# Patient Record
Sex: FEMALE | Race: WHITE | NOT HISPANIC OR LATINO | Employment: FULL TIME | URBAN - NONMETROPOLITAN AREA
[De-identification: names, ages, dates, MRNs, and addresses within clinical notes are randomized per-mention and may not be internally consistent; named-entity substitution may affect disease eponyms.]

---

## 2021-07-06 ENCOUNTER — OFFICE VISIT (OUTPATIENT)
Dept: CARDIOLOGY | Facility: CLINIC | Age: 21
End: 2021-07-06

## 2021-07-06 VITALS
HEART RATE: 94 BPM | OXYGEN SATURATION: 98 % | WEIGHT: 248.2 LBS | BODY MASS INDEX: 37.62 KG/M2 | DIASTOLIC BLOOD PRESSURE: 81 MMHG | HEIGHT: 68 IN | SYSTOLIC BLOOD PRESSURE: 127 MMHG

## 2021-07-06 DIAGNOSIS — R07.2 PRECORDIAL PAIN: Primary | ICD-10-CM

## 2021-07-06 DIAGNOSIS — R06.02 SHORTNESS OF BREATH: ICD-10-CM

## 2021-07-06 DIAGNOSIS — Z82.49 FAMILY HISTORY OF EARLY CAD: ICD-10-CM

## 2021-07-06 DIAGNOSIS — R00.2 PALPITATIONS: ICD-10-CM

## 2021-07-06 PROCEDURE — 99204 OFFICE O/P NEW MOD 45 MIN: CPT | Performed by: PHYSICIAN ASSISTANT

## 2021-07-06 PROCEDURE — 93000 ELECTROCARDIOGRAM COMPLETE: CPT | Performed by: PHYSICIAN ASSISTANT

## 2021-07-06 RX ORDER — BUPROPION HYDROCHLORIDE 150 MG/1
150 TABLET ORAL DAILY
COMMUNITY
End: 2023-01-05

## 2021-07-06 RX ORDER — TRAZODONE HYDROCHLORIDE 50 MG/1
50 TABLET ORAL NIGHTLY
COMMUNITY
End: 2023-01-05

## 2021-07-06 NOTE — PATIENT INSTRUCTIONS

## 2021-07-06 NOTE — PROGRESS NOTES
Subjective   Eliza Aden is a 21 y.o. female     Chief Complaint   Patient presents with   • Establish Care       HPI  The patient presents to the clinic today to establish cardiovascular care, referred in the setting of symptoms and significant family history of premature coronary artery disease.  The patient denies a personal cardiovascular history.  She does note over the last few weeks to few months episodes of palpitations, chest discomfort, and increasing baseline dyspnea.  She reports her palpitations are brief irregularities in heart rhythm, not necessarily associated with dizziness.  She has never experienced syncope with that.  When symptoms are little more significant, the patient will no chest discomfort.  She describes more of a tightness and aching.  She has no associated neck, arm, or jaw discomfort.  She does have increasing dyspnea at that time.  Her father  at a very young age presumably from sudden cardiac death.  There is concern given that history and cardiac evaluation has been requested.  Previous laboratories suggest insulin resistance but have been unremarkable otherwise.  She presents today for evaluation and work-up given family history and symptoms.      Current Outpatient Medications   Medication Sig Dispense Refill   • buPROPion XL (WELLBUTRIN XL) 150 MG 24 hr tablet Take 150 mg by mouth Daily.     • traZODone (DESYREL) 50 MG tablet Take 50 mg by mouth Every Night.       No current facility-administered medications for this visit.       Patient has no known allergies.    Past Medical History:   Diagnosis Date   • Anxiety    • Asthma        Social History     Socioeconomic History   • Marital status: Single     Spouse name: Not on file   • Number of children: Not on file   • Years of education: Not on file   • Highest education level: Not on file   Tobacco Use   • Smoking status: Never Smoker   • Smokeless tobacco: Never Used   Substance and Sexual Activity   • Alcohol use: Never  "  • Drug use: Never   • Sexual activity: Never       Family History   Problem Relation Age of Onset   • No Known Problems Mother    • Heart attack Father    • Hypertension Father    • Hyperlipidemia Father    • Ovarian cancer Maternal Grandmother    • Hypertension Maternal Grandfather    • Hyperlipidemia Maternal Grandfather    • Heart disease Maternal Grandfather    • No Known Problems Paternal Grandmother    • Heart attack Paternal Grandfather        Review of Systems   Constitutional: Negative.  Negative for chills, fatigue and fever.   HENT: Negative for congestion, rhinorrhea and sore throat.    Eyes: Positive for visual disturbance (glasses).   Respiratory: Negative.  Negative for chest tightness, shortness of breath and wheezing.    Cardiovascular: Positive for palpitations. Negative for chest pain and leg swelling.   Gastrointestinal: Negative.    Endocrine: Negative.    Genitourinary: Negative.    Musculoskeletal: Negative.  Negative for arthralgias, back pain and neck pain.   Skin: Negative.  Negative for rash and wound.   Allergic/Immunologic: Positive for environmental allergies.   Neurological: Positive for headaches. Negative for dizziness, weakness and numbness.   Hematological: Negative.  Does not bruise/bleed easily.   Psychiatric/Behavioral: Positive for sleep disturbance (falling / staying asleep ).       Objective     Vitals:    07/06/21 1517   BP: 127/81   BP Location: Left arm   Patient Position: Sitting   Pulse: 94   SpO2: 98%   Weight: 113 kg (248 lb 3.2 oz)   Height: 172.7 cm (68\")        /81 (BP Location: Left arm, Patient Position: Sitting)   Pulse 94   Ht 172.7 cm (68\")   Wt 113 kg (248 lb 3.2 oz)   SpO2 98%   BMI 37.74 kg/m²      Lab Results (most recent)     None          Physical Exam  Vitals and nursing note reviewed.   Constitutional:       General: She is not in acute distress.     Appearance: She is well-developed.   HENT:      Head: Normocephalic and atraumatic.   Eyes: "      Conjunctiva/sclera: Conjunctivae normal.      Pupils: Pupils are equal, round, and reactive to light.   Neck:      Vascular: No JVD.      Trachea: No tracheal deviation.   Cardiovascular:      Rate and Rhythm: Normal rate and regular rhythm.      Heart sounds: Normal heart sounds.   Pulmonary:      Effort: Pulmonary effort is normal.      Breath sounds: Normal breath sounds.   Abdominal:      General: Bowel sounds are normal. There is no distension.      Palpations: Abdomen is soft. There is no mass.      Tenderness: There is no abdominal tenderness. There is no guarding or rebound.   Musculoskeletal:         General: No tenderness or deformity. Normal range of motion.      Cervical back: Normal range of motion and neck supple.   Skin:     General: Skin is warm and dry.      Coloration: Skin is not pale.      Findings: No erythema or rash.   Neurological:      Mental Status: She is alert and oriented to person, place, and time.   Psychiatric:         Behavior: Behavior normal.         Thought Content: Thought content normal.         Judgment: Judgment normal.         Procedure     ECG 12 Lead    Date/Time: 7/6/2021 3:22 PM  Performed by: Russell Dc PA  Authorized by: Russell Dc PA   Comparison: not compared with previous ECG   Comments: Sinus rhythm, rate 82, PVCs noted, no acute changes noted.  PVCs correlate with patient's symptoms of palpitations.                 Assessment/Plan      Diagnosis Plan   1. Precordial pain  Adult Transthoracic Echo Complete W/ Cont if Necessary Per Protocol    Holter Monitor - 24 Hour    Treadmill Stress Test   2. Shortness of breath  Adult Transthoracic Echo Complete W/ Cont if Necessary Per Protocol    Holter Monitor - 24 Hour    Treadmill Stress Test   3. Palpitations  Adult Transthoracic Echo Complete W/ Cont if Necessary Per Protocol    Holter Monitor - 24 Hour    Treadmill Stress Test   4. Family history of early CAD  Adult Transthoracic Echo Complete W/ Cont  if Necessary Per Protocol    Holter Monitor - 24 Hour    Treadmill Stress Test     1.  With the patient's family history and symptoms otherwise as above, she will be scheduled for cardiac evaluation.  I would schedule for regular treadmill stress test just for a stratification and evaluation otherwise.    2.  We will also schedule for an echo to evaluate the patient structurally, particularly given family history and ongoing symptoms as above.    3.  She does have palpitations which correlate well to PVCs noted by EKG today.  She does feel that some of her symptoms, in particular palpitations, are directly related to stress/anxiety.  If needed, we can consider suppressive therapies once we know results of the above studies.    4.  We will see her back after the above studies are available.  If abnormal, we will see her immediately.  She will call for any ongoing issues.  Further pending above.               Electronically signed by:

## 2021-07-15 ENCOUNTER — HOSPITAL ENCOUNTER (OUTPATIENT)
Dept: CARDIOLOGY | Facility: HOSPITAL | Age: 21
Discharge: HOME | End: 2021-07-15
Admitting: PHYSICIAN ASSISTANT

## 2021-07-15 DIAGNOSIS — Z82.49 FAMILY HISTORY OF EARLY CAD: ICD-10-CM

## 2021-07-15 DIAGNOSIS — R00.2 PALPITATIONS: ICD-10-CM

## 2021-07-15 DIAGNOSIS — R06.02 SHORTNESS OF BREATH: ICD-10-CM

## 2021-07-15 DIAGNOSIS — R07.2 PRECORDIAL PAIN: ICD-10-CM

## 2021-07-15 PROCEDURE — 93306 TTE W/DOPPLER COMPLETE: CPT | Performed by: INTERNAL MEDICINE

## 2021-07-15 PROCEDURE — 93306 TTE W/DOPPLER COMPLETE: CPT

## 2021-07-25 LAB
BH CV ECHO MEAS - ACS: 2.3 CM
BH CV ECHO MEAS - AO MAX PG: 4.2 MMHG
BH CV ECHO MEAS - AO MEAN PG: 3 MMHG
BH CV ECHO MEAS - AO ROOT AREA (BSA CORRECTED): 1.4
BH CV ECHO MEAS - AO ROOT AREA: 7.8 CM^2
BH CV ECHO MEAS - AO ROOT DIAM: 3.2 CM
BH CV ECHO MEAS - AO V2 MAX: 103 CM/SEC
BH CV ECHO MEAS - AO V2 MEAN: 75.1 CM/SEC
BH CV ECHO MEAS - AO V2 VTI: 21.2 CM
BH CV ECHO MEAS - BSA(HAYCOCK): 2.4 M^2
BH CV ECHO MEAS - BSA: 2.2 M^2
BH CV ECHO MEAS - BZI_BMI: 37.7 KILOGRAMS/M^2
BH CV ECHO MEAS - BZI_METRIC_HEIGHT: 172.7 CM
BH CV ECHO MEAS - BZI_METRIC_WEIGHT: 112.5 KG
BH CV ECHO MEAS - EDV(CUBED): 92.3 ML
BH CV ECHO MEAS - EDV(MOD-SP4): 101 ML
BH CV ECHO MEAS - EDV(TEICH): 93.4 ML
BH CV ECHO MEAS - EF(CUBED): 60.4 %
BH CV ECHO MEAS - EF(MOD-SP4): 53.5 %
BH CV ECHO MEAS - EF(TEICH): 52.1 %
BH CV ECHO MEAS - EF_3D-VOL: 49 %
BH CV ECHO MEAS - ESV(CUBED): 36.6 ML
BH CV ECHO MEAS - ESV(MOD-SP4): 47 ML
BH CV ECHO MEAS - ESV(TEICH): 44.8 ML
BH CV ECHO MEAS - FS: 26.5 %
BH CV ECHO MEAS - IVS/LVPW: 1
BH CV ECHO MEAS - IVSD: 0.98 CM
BH CV ECHO MEAS - LA DIMENSION: 3.4 CM
BH CV ECHO MEAS - LA/AO: 1.1
BH CV ECHO MEAS - LV DIASTOLIC VOL/BSA (35-75): 45.1 ML/M^2
BH CV ECHO MEAS - LV IVRT: 0.12 SEC
BH CV ECHO MEAS - LV MASS(C)D: 148.7 GRAMS
BH CV ECHO MEAS - LV MASS(C)DI: 66.4 GRAMS/M^2
BH CV ECHO MEAS - LV SYSTOLIC VOL/BSA (12-30): 21 ML/M^2
BH CV ECHO MEAS - LVIDD: 4.5 CM
BH CV ECHO MEAS - LVIDS: 3.3 CM
BH CV ECHO MEAS - LVLD AP4: 8.3 CM
BH CV ECHO MEAS - LVLS AP4: 6.7 CM
BH CV ECHO MEAS - LVOT AREA (M): 4.2 CM^2
BH CV ECHO MEAS - LVOT AREA: 4.2 CM^2
BH CV ECHO MEAS - LVOT DIAM: 2.3 CM
BH CV ECHO MEAS - LVPWD: 0.96 CM
BH CV ECHO MEAS - MV A MAX VEL: 42 CM/SEC
BH CV ECHO MEAS - MV DEC SLOPE: 323 CM/SEC^2
BH CV ECHO MEAS - MV E MAX VEL: 81 CM/SEC
BH CV ECHO MEAS - MV E/A: 1.9
BH CV ECHO MEAS - RAP SYSTOLE: 10 MMHG
BH CV ECHO MEAS - RVDD: 3.4 CM
BH CV ECHO MEAS - RVSP: 21.7 MMHG
BH CV ECHO MEAS - SI(AO): 73.8 ML/M^2
BH CV ECHO MEAS - SI(CUBED): 24.9 ML/M^2
BH CV ECHO MEAS - SI(MOD-SP4): 24.1 ML/M^2
BH CV ECHO MEAS - SI(TEICH): 21.7 ML/M^2
BH CV ECHO MEAS - SV(AO): 165.2 ML
BH CV ECHO MEAS - SV(CUBED): 55.8 ML
BH CV ECHO MEAS - SV(MOD-SP4): 54 ML
BH CV ECHO MEAS - SV(TEICH): 48.6 ML
BH CV ECHO MEAS - TR MAX VEL: 171 CM/SEC
MAXIMAL PREDICTED HEART RATE: 200 BPM
STRESS TARGET HR: 170 BPM

## 2021-07-26 ENCOUNTER — TELEPHONE (OUTPATIENT)
Dept: CARDIOLOGY | Facility: CLINIC | Age: 21
End: 2021-07-26

## 2021-07-26 NOTE — TELEPHONE ENCOUNTER
Left VM for patient     Echo results     Heart pump function did come back low BUT LOW NORMAL - it is NORMAL and is GOOD for her.       Patient to call back with any questions or concerns.     AT [facility]         ----- Message from NEMESIO Downs sent at 7/26/2021  8:48 AM EDT -----  See me on this study.

## 2022-12-28 ENCOUNTER — TELEPHONE (OUTPATIENT)
Dept: OBSTETRICS AND GYNECOLOGY | Facility: CLINIC | Age: 22
End: 2022-12-28

## 2022-12-28 NOTE — TELEPHONE ENCOUNTER
----- Message from Talisha Geller MA sent at 12/28/2022  9:13 AM EST -----  Regarding: FW: WANTING ASSISTANCE    ----- Message -----  From: Lala De La O RegSched Rep  Sent: 12/28/2022   9:11 AM EST  To: [facility] Gyn Clinical Pool  Subject: WANTING ASSISTANCE                               DINA RODAS APRN [phone]    WOULD LIKE TO SPEAK W/ SHAYY ARDON FOR ADVICE FOR PT CARE BEFORE, HER APPT

## 2023-01-03 ENCOUNTER — TELEPHONE (OUTPATIENT)
Dept: OBSTETRICS AND GYNECOLOGY | Facility: CLINIC | Age: 23
End: 2023-01-03
Payer: COMMERCIAL

## 2023-01-03 NOTE — TELEPHONE ENCOUNTER
S/w Shelia CHA at [facility] re: pt - states pt is having heavy bldg for the last 10 days now.  Hgb done 12/30/22 was 11.9 and pt has been advised to start daily PNV.  Taking Natazia COCs but Shelia is unsure for how long the pt has been on these or where she is in her current pill pkg.  States she did do a pregnancy test that was negative.  Advised her to tell pt if she saturates a pad q 30 min for 2 hrs consecutively, then she needs to rpt to ER.  If she did not start pills with a period, then she can try stopping them for 3-4 days and then restarting them but must be sure to be faithful with condom use for the next 7 days for contraception.  Pt has appt on 2/3/22 - will add her to the cancellation list.

## 2023-01-03 NOTE — TELEPHONE ENCOUNTER
Shelia with [facility] is calling and harpreet like to speak to you regarding this patient - you have not seen her but she is on your schedule  For 2/3/2023  [phone] after 4:30 [phone]

## 2023-01-04 ENCOUNTER — HOSPITAL ENCOUNTER (EMERGENCY)
Facility: HOSPITAL | Age: 23
Discharge: HOME | End: 2023-01-04
Attending: EMERGENCY MEDICINE | Admitting: EMERGENCY MEDICINE
Payer: COMMERCIAL

## 2023-01-04 VITALS
OXYGEN SATURATION: 100 % | WEIGHT: 250 LBS | HEART RATE: 112 BPM | RESPIRATION RATE: 16 BRPM | HEIGHT: 67 IN | TEMPERATURE: 98.2 F | DIASTOLIC BLOOD PRESSURE: 91 MMHG | SYSTOLIC BLOOD PRESSURE: 129 MMHG | BODY MASS INDEX: 39.24 KG/M2

## 2023-01-04 DIAGNOSIS — N93.9 ABNORMAL VAGINAL BLEEDING: Primary | ICD-10-CM

## 2023-01-04 DIAGNOSIS — D64.9 ANEMIA, UNSPECIFIED TYPE: ICD-10-CM

## 2023-01-04 LAB
BASOPHILS # BLD AUTO: 0.02 10*3/MM3 (ref 0–0.2)
BASOPHILS NFR BLD AUTO: 0.2 % (ref 0–1.5)
DEPRECATED RDW RBC AUTO: 43.5 FL (ref 37–54)
EOSINOPHIL # BLD AUTO: 0.07 10*3/MM3 (ref 0–0.4)
EOSINOPHIL NFR BLD AUTO: 0.8 % (ref 0.3–6.2)
ERYTHROCYTE [DISTWIDTH] IN BLOOD BY AUTOMATED COUNT: 14 % (ref 12.3–15.4)
HCG INTACT+B SERPL-ACNC: <0.1 MIU/ML
HCT VFR BLD AUTO: 34 % (ref 34–46.6)
HGB BLD-MCNC: 10.6 G/DL (ref 12–15.9)
HOLD SPECIMEN: NORMAL
IMM GRANULOCYTES # BLD AUTO: 0.05 10*3/MM3 (ref 0–0.05)
IMM GRANULOCYTES NFR BLD AUTO: 0.6 % (ref 0–0.5)
LYMPHOCYTES # BLD AUTO: 2.7 10*3/MM3 (ref 0.7–3.1)
LYMPHOCYTES NFR BLD AUTO: 30.5 % (ref 19.6–45.3)
MCH RBC QN AUTO: 27 PG (ref 26.6–33)
MCHC RBC AUTO-ENTMCNC: 31.2 G/DL (ref 31.5–35.7)
MCV RBC AUTO: 86.7 FL (ref 79–97)
MONOCYTES # BLD AUTO: 0.46 10*3/MM3 (ref 0.1–0.9)
MONOCYTES NFR BLD AUTO: 5.2 % (ref 5–12)
NEUTROPHILS NFR BLD AUTO: 5.55 10*3/MM3 (ref 1.7–7)
NEUTROPHILS NFR BLD AUTO: 62.7 % (ref 42.7–76)
NRBC BLD AUTO-RTO: 0 /100 WBC (ref 0–0.2)
PLATELET # BLD AUTO: 258 10*3/MM3 (ref 140–450)
PMV BLD AUTO: 9.8 FL (ref 6–12)
RBC # BLD AUTO: 3.92 10*6/MM3 (ref 3.77–5.28)
WBC NRBC COR # BLD: 8.85 10*3/MM3 (ref 3.4–10.8)
WHOLE BLOOD HOLD COAG: NORMAL
WHOLE BLOOD HOLD SPECIMEN: NORMAL

## 2023-01-04 PROCEDURE — 85025 COMPLETE CBC W/AUTO DIFF WBC: CPT | Performed by: EMERGENCY MEDICINE

## 2023-01-04 PROCEDURE — 99282 EMERGENCY DEPT VISIT SF MDM: CPT

## 2023-01-04 PROCEDURE — 84702 CHORIONIC GONADOTROPIN TEST: CPT | Performed by: EMERGENCY MEDICINE

## 2023-01-04 PROCEDURE — 36415 COLL VENOUS BLD VENIPUNCTURE: CPT

## 2023-01-04 RX ORDER — SODIUM CHLORIDE 0.9 % (FLUSH) 0.9 %
10 SYRINGE (ML) INJECTION AS NEEDED
Status: DISCONTINUED | OUTPATIENT
Start: 2023-01-04 | End: 2023-01-04 | Stop reason: HOSPADM

## 2023-01-04 NOTE — DISCHARGE INSTRUCTIONS
Take 2 of your birth control pills on a daily basis until follow-up.    Push fluids.    If any concern or worsening condition please return to the emergency department.

## 2023-01-04 NOTE — ED PROVIDER NOTES
EMERGENCY DEPARTMENT ENCOUNTER    Pt Name: Eliza Aden  MRN: [id number]  Pt :   2000  Room Number:    Date of encounter:  2023  PCP: Shelia Camarillo APRN  ED Provider: Asif Keys MD    Historian: Patient and mother      HPI:  Chief Complaint: Heavy vaginal bleeding        Context: Eliza Aden is a 22 y.o. female who presents to the ED c/o heavy vaginal bleeding which is been ongoing for roughly 1 month.  The patient reports heavy bleeding which is increased over the last 10 days and increased even further over the last 3 days.  The patient is not sexually active.  She is on birth control pills and is currently not on her sugar pill.  She has been following with a [place] gynecologist but now has an appointment here at [facility] with a gynecologist, Marquita Russell, tomorrow.  She has not previously had a pelvic examination.  The patient reports suprapubic abdominal pain which waxes and wanes.  She reports that it is currently mild in severity.  No fevers, chills, vomiting, diarrhea, cough.      PAST MEDICAL HISTORY  Past Medical History:   Diagnosis Date   • Anxiety    • Asthma          PAST SURGICAL HISTORY  Past Surgical History:   Procedure Laterality Date   • WISDOM TOOTH EXTRACTION           FAMILY HISTORY  Family History   Problem Relation Age of Onset   • No Known Problems Mother    • Heart attack Father    • Hypertension Father    • Hyperlipidemia Father    • Ovarian cancer Maternal Grandmother    • Hypertension Maternal Grandfather    • Hyperlipidemia Maternal Grandfather    • Heart disease Maternal Grandfather    • No Known Problems Paternal Grandmother    • Heart attack Paternal Grandfather          SOCIAL HISTORY  Social History     Socioeconomic History   • Marital status: Single   Tobacco Use   • Smoking status: Never   • Smokeless tobacco: Never   Substance and Sexual Activity   • Alcohol use: Never   • Drug use: Never   • Sexual activity: Never         ALLERGIES  Patient  has no known allergies.        REVIEW OF SYSTEMS  Review of Systems       All systems reviewed and negative except for those discussed in HPI.       PHYSICAL EXAM    I have reviewed the triage vital signs and nursing notes.    ED Triage Vitals   Temp Heart Rate Resp BP SpO2   01/04/23 0949 01/04/23 0949 01/04/23 0949 01/04/23 0950 01/04/23 0949   98.2 °F (36.8 °C) 112 16 149/92 97 %      Temp src Heart Rate Source Patient Position BP Location FiO2 (%)   -- -- -- -- --              Physical Exam  GENERAL:   Appears in no acute distress.  She presents with her mother who is a helpful historian.    HENT: Nares patent.  EYES: No scleral icterus.  CV: Regular rhythm, regular rate.  2+ radial pulses  RESPIRATORY: Normal effort.  No audible wheezes, rales or rhonchi.  ABDOMEN: Soft, non suprapubic abdominal tenderness to palpation.  No guarding rebound or rigidity.  MUSCULOSKELETAL: No deformities.   NEURO: Alert, moves all extremities, follows commands.  SKIN: Warm, dry, no rash visualized.      LAB RESULTS  Recent Results (from the past 24 hour(s))   Green Top (Gel)    Collection Time: 01/04/23 11:08 AM   Result Value Ref Range    Extra Tube Hold for add-ons.    Lavender Top    Collection Time: 01/04/23 11:08 AM   Result Value Ref Range    Extra Tube hold for add-on    Gold Top - SST    Collection Time: 01/04/23 11:08 AM   Result Value Ref Range    Extra Tube Hold for add-ons.    Light Blue Top    Collection Time: 01/04/23 11:08 AM   Result Value Ref Range    Extra Tube Hold for add-ons.    CBC Auto Differential    Collection Time: 01/04/23 11:08 AM    Specimen: Blood   Result Value Ref Range    WBC 8.85 3.40 - 10.80 10*3/mm3    RBC 3.92 3.77 - 5.28 10*6/mm3    Hemoglobin 10.6 (L) 12.0 - 15.9 g/dL    Hematocrit 34.0 34.0 - 46.6 %    MCV 86.7 79.0 - 97.0 fL    MCH 27.0 26.6 - 33.0 pg    MCHC 31.2 (L) 31.5 - 35.7 g/dL    RDW 14.0 12.3 - 15.4 %    RDW-SD 43.5 37.0 - 54.0 fl    MPV 9.8 6.0 - 12.0 fL    Platelets 258 140 -  450 10*3/mm3    Neutrophil % 62.7 42.7 - 76.0 %    Lymphocyte % 30.5 19.6 - 45.3 %    Monocyte % 5.2 5.0 - 12.0 %    Eosinophil % 0.8 0.3 - 6.2 %    Basophil % 0.2 0.0 - 1.5 %    Immature Grans % 0.6 (H) 0.0 - 0.5 %    Neutrophils, Absolute 5.55 1.70 - 7.00 10*3/mm3    Lymphocytes, Absolute 2.70 0.70 - 3.10 10*3/mm3    Monocytes, Absolute 0.46 0.10 - 0.90 10*3/mm3    Eosinophils, Absolute 0.07 0.00 - 0.40 10*3/mm3    Basophils, Absolute 0.02 0.00 - 0.20 10*3/mm3    Immature Grans, Absolute 0.05 0.00 - 0.05 10*3/mm3    nRBC 0.0 0.0 - 0.2 /100 WBC   hCG, Quantitative, Pregnancy    Collection Time: 01/04/23 11:08 AM    Specimen: Blood   Result Value Ref Range    HCG Quantitative <0.10 mIU/mL       If labs were ordered, I independently reviewed the results and considered them in treating the patient.        RADIOLOGY  No Radiology Exams Resulted Within Past 24 Hours          PROCEDURES    Procedures    No orders to display       MEDICATIONS GIVEN IN ER    Medications   sodium chloride 0.9 % flush 10 mL (has no administration in time range)         MEDICAL DECISION MAKING, PROGRESS, and CONSULTS    All labs have been independently reviewed by me.  All radiology studies have been reviewed by me and the radiologist dictating the report.  All EKG's have been independently viewed and interpreted by me.      Discussion below represents my analysis of pertinent findings related to patient's condition, differential diagnosis, treatment plan and final disposition.      Differential diagnosis:    Abnormal uterine bleeding.  Consider hormonal, fibroids, etc.      Additional sources:    - Discussed/ obtained information from independent historians: Patient's mother    - External (non-ED) record review: Confirmed in [facility] that the patient has an appointment with Marquita Russell tomorrow.    - Shared decision making: I spoke with patient and mother about our plan for evaluation and treatment.  They are in agreement.      Orders placed during  this visit:  Orders Placed This Encounter   Procedures   • [place] Draw   • CBC Auto Differential   • hCG, Quantitative, Pregnancy   • NPO Diet NPO Type: Strict NPO   • Undress & Gown   • Cardiac Monitoring (for HR >100 or SBP <110)   • Vital Signs   • Orthostatic Blood Pressure   • Supplies To Bedside - Notify MD When Ready- Pelvic cart / set up   • Vital Signs Recheck   • Pulse Oximetry   • Oxygen Therapy- Nasal Cannula; 2 LPM; Titrate for SPO2: equal to or greater than, 92%   • Insert Peripheral IV   • CBC & Differential   • Green Top (Gel)   • Lavender Top   • Gold Top - SST   • Gray Top   • Light Blue Top         Additional orders considered but not ordered:  Transvaginal ultrasound.    ED Course:    Consultants:      ED Course as of 01/04/23 1231   Wed Jan 04, 2023   1216 I have paged Dr. Acuña, on-call for Marquita Russell, the patient's scheduled nurse practitioner. [MS]   1229 I spoke with Dr. Grewal.  Case discussed in detail.  She reviewed the patient's epic chart.  She recommends the patient double up on her birth control pills and follow-up tomorrow as scheduled.  I spoke with the patient and her mother about this and they are both comfortable with this.  They understand the need to return if worse. [MS]      ED Course User Index  [MS] Asif Keys MD                  AS OF 12:31 EST VITALS:    BP - 149/92  HR - 112  TEMP - 98.2 °F (36.8 °C)  O2 SATS - 97%                  DIAGNOSIS  Final diagnoses:   Abnormal vaginal bleeding   Anemia, unspecified type         DISPOSITION  DISCHARGE    Patient discharged in stable condition.    Reviewed implications of results, diagnosis, meds, responsibility to follow up, warning signs and symptoms of possible worsening, potential complications and reasons to return to ER.    Patient/Family voiced understanding of above instructions.    Discussed plan for discharge, as there is no emergent indication for admission.  Pt/family is agreeable and understands need for follow up  and possible repeat testing.  Pt/family is aware that discharge does not mean that nothing is wrong but that it indicates no emergency is currently present that requires admission and they must continue care with follow-up as given below or with a physician of their choice.     FOLLOW-UP  Laisha Russell, APRN  [id number] [id number]  [phone]    In 1 day  AS ALREADY SCHEDULED         Medication List      No changes were made to your prescriptions during this visit.             Please note that portions of this document were completed with voice recognition software.      Asif Keys MD  01/04/23 5588

## 2023-01-05 ENCOUNTER — OFFICE VISIT (OUTPATIENT)
Dept: OBSTETRICS AND GYNECOLOGY | Facility: CLINIC | Age: 23
End: 2023-01-05
Payer: COMMERCIAL

## 2023-01-05 VITALS
DIASTOLIC BLOOD PRESSURE: 76 MMHG | BODY MASS INDEX: 40.87 KG/M2 | SYSTOLIC BLOOD PRESSURE: 112 MMHG | HEIGHT: 67 IN | WEIGHT: 260.4 LBS

## 2023-01-05 DIAGNOSIS — N91.3 PRIMARY OLIGOMENORRHEA: ICD-10-CM

## 2023-01-05 DIAGNOSIS — N92.1 MENORRHAGIA WITH IRREGULAR CYCLE: Primary | ICD-10-CM

## 2023-01-05 PROCEDURE — 99203 OFFICE O/P NEW LOW 30 MIN: CPT | Performed by: NURSE PRACTITIONER

## 2023-01-05 RX ORDER — DROSPIRENONE AND ESTETROL 3-14.2(28)
1 KIT ORAL DAILY
COMMUNITY
End: 2023-01-05

## 2023-01-05 RX ORDER — IBUPROFEN 800 MG/1
TABLET ORAL
COMMUNITY
Start: 2022-12-28 | End: 2023-04-05

## 2023-01-05 RX ORDER — LEVONORGESTREL AND ETHINYL ESTRADIOL 0.1-0.02MG
1 KIT ORAL DAILY
Qty: 28 TABLET | Refills: 12 | Status: SHIPPED | OUTPATIENT
Start: 2023-01-05 | End: 2024-01-05

## 2023-01-05 NOTE — PROGRESS NOTES
Chief Complaint  Eliza Aden is a 22 y.o.  female presenting for Gynecologic Exam (Patient was seen in  ED yesterday due to menorrhagia.  No exam done./Dysmenorrhea and back pain.  )    History of Present Illness  Eliza is a very pleasant 23yo nulligravid woman, here because of heavy and prolonged vaginal bleeding.  She got menarche at 15yo, and has always had irregular menstrual cycles.  Lifelong hx oligomenorrhea (sometimes as few as two menses/ year).  When she skips months, then she will have heavy & prolonged flow.  We have labs from PCP with a Hgb11.9 on 22.  At ER yesterday, Hgb was 10.6.  She was encouraged to double up on the OCPs, and it has helped flow.  She has never been SA.  No vaginitis sx.  She has never been told that she has polycystic appearance of ovaries, and has no significant hx of hyperandrogenism s/sx.      The following portions of the patient's history were reviewed and updated as appropriate: allergies, current medications, past family history, past medical history, past social history, past surgical history and problem list.    No Known Allergies      Current Outpatient Medications:   •  ibuprofen (ADVIL,MOTRIN) 800 MG tablet, TAKE 1 TABLET BY MOUTH EVERY 8 HOURS AS NEEDED FOR DISCOMFORT, Disp: , Rfl:   •  Levonorgest-Eth Estrad-Fe Bisg (Balcoltra) 0.1-20 MG-MCG(21) per tablet, Take 1 tablet by mouth Daily., Disp: 28 tablet, Rfl: 12    Past Medical History:   Diagnosis Date   • Anxiety    • Asthma         Past Surgical History:   Procedure Laterality Date   • WISDOM TOOTH EXTRACTION         Objective  /76   Ht 170.2 cm (67\")   Wt 118 kg (260 lb 6.4 oz)   LMP 2022 (Exact Date)   Breastfeeding No   BMI 40.78 kg/m²     Physical Exam  Vitals and nursing note reviewed. Exam conducted with a chaperone present.   Constitutional:       General: She is not in acute distress.     Appearance: Normal appearance. She is not ill-appearing.   Abdominal:       Palpations: Abdomen is soft. There is no mass.      Tenderness: There is no abdominal tenderness.   Genitourinary:     General: Normal vulva.      Labia:         Right: No rash, tenderness or lesion.         Left: No rash, tenderness or lesion.       Vagina: Normal. No erythema.      Cervix: No cervical motion tenderness, discharge, lesion or erythema.      Uterus: Normal. Not enlarged and not tender.       Adnexa: Right adnexa normal and left adnexa normal.        Right: No mass or tenderness.          Left: No mass or tenderness.        Rectum: Normal.      Comments: Anus appears wnl.  (No rectal exam performed.)  Skin:     General: Skin is warm and dry.   Neurological:      Mental Status: She is oriented to person, place, and time.   Psychiatric:         Mood and Affect: Mood normal.         Behavior: Behavior normal.         Assessment/Plan   Diagnoses and all orders for this visit:    1. Menorrhagia with irregular cycle (Primary)    2. Primary oligomenorrhea    Other orders  -     Levonorgest-Eth Estrad-Fe Bisg (Balcoltra) 0.1-20 MG-MCG(21) per tablet; Take 1 tablet by mouth Daily.  Dispense: 28 tablet; Refill: 12        Procedures    19 to 39: Counseling/Anticipatory Guidance Discussed: family planning/contraception    Return in about 3 months (around 4/5/2023) for Recheck.    Laisha Russell, STARLA  01/05/2023

## 2023-01-13 ENCOUNTER — HOSPITAL ENCOUNTER (OUTPATIENT)
Facility: HOSPITAL | Age: 23
Setting detail: OBSERVATION
Discharge: HOME | End: 2023-01-14
Attending: STUDENT IN AN ORGANIZED HEALTH CARE EDUCATION/TRAINING PROGRAM | Admitting: STUDENT IN AN ORGANIZED HEALTH CARE EDUCATION/TRAINING PROGRAM
Payer: COMMERCIAL

## 2023-01-13 ENCOUNTER — PREP FOR SURGERY (OUTPATIENT)
Dept: OTHER | Facility: HOSPITAL | Age: 23
End: 2023-01-13
Payer: COMMERCIAL

## 2023-01-13 ENCOUNTER — OFFICE VISIT (OUTPATIENT)
Dept: OBSTETRICS AND GYNECOLOGY | Facility: CLINIC | Age: 23
End: 2023-01-13
Payer: COMMERCIAL

## 2023-01-13 VITALS
BODY MASS INDEX: 40.81 KG/M2 | HEART RATE: 107 BPM | SYSTOLIC BLOOD PRESSURE: 122 MMHG | DIASTOLIC BLOOD PRESSURE: 80 MMHG | WEIGHT: 260 LBS | HEIGHT: 67 IN | OXYGEN SATURATION: 100 %

## 2023-01-13 DIAGNOSIS — D64.9 SYMPTOMATIC ANEMIA: ICD-10-CM

## 2023-01-13 DIAGNOSIS — D62 ANEMIA DUE TO ACUTE BLOOD LOSS: ICD-10-CM

## 2023-01-13 DIAGNOSIS — N92.1 MENORRHAGIA WITH IRREGULAR CYCLE: Primary | ICD-10-CM

## 2023-01-13 DIAGNOSIS — D64.9 SYMPTOMATIC ANEMIA: Primary | ICD-10-CM

## 2023-01-13 LAB
ABO GROUP BLD: NORMAL
ABO GROUP BLD: NORMAL
APTT PPP: 23.4 SECONDS (ref 22–39)
BASOPHILS # BLD AUTO: 0.02 10*3/MM3 (ref 0–0.2)
BASOPHILS NFR BLD AUTO: 0.3 % (ref 0–1.5)
BLD GP AB SCN SERPL QL: NEGATIVE
DEPRECATED RDW RBC AUTO: 46.9 FL (ref 37–54)
EOSINOPHIL # BLD AUTO: 0.05 10*3/MM3 (ref 0–0.4)
EOSINOPHIL NFR BLD AUTO: 0.7 % (ref 0.3–6.2)
ERYTHROCYTE [DISTWIDTH] IN BLOOD BY AUTOMATED COUNT: 14.7 % (ref 12.3–15.4)
FERRITIN SERPL-MCNC: 16.11 NG/ML (ref 13–150)
FOLATE SERPL-MCNC: 17.2 NG/ML (ref 4.78–24.2)
HCT VFR BLD AUTO: 21.5 % (ref 34–46.6)
HGB BLD-MCNC: 6.6 G/DL (ref 12–15.9)
IMM GRANULOCYTES # BLD AUTO: 0.05 10*3/MM3 (ref 0–0.05)
IMM GRANULOCYTES NFR BLD AUTO: 0.7 % (ref 0–0.5)
INR PPP: 1 (ref 0.84–1.13)
IRON 24H UR-MRATE: 21 MCG/DL (ref 37–145)
IRON SATN MFR SERPL: 4 % (ref 20–50)
LYMPHOCYTES # BLD AUTO: 2.39 10*3/MM3 (ref 0.7–3.1)
LYMPHOCYTES NFR BLD AUTO: 34.9 % (ref 19.6–45.3)
MCH RBC QN AUTO: 27.4 PG (ref 26.6–33)
MCHC RBC AUTO-ENTMCNC: 30.7 G/DL (ref 31.5–35.7)
MCV RBC AUTO: 89.2 FL (ref 79–97)
MONOCYTES # BLD AUTO: 0.31 10*3/MM3 (ref 0.1–0.9)
MONOCYTES NFR BLD AUTO: 4.5 % (ref 5–12)
NEUTROPHILS NFR BLD AUTO: 4.03 10*3/MM3 (ref 1.7–7)
NEUTROPHILS NFR BLD AUTO: 58.9 % (ref 42.7–76)
NRBC BLD AUTO-RTO: 0 /100 WBC (ref 0–0.2)
PLATELET # BLD AUTO: 259 10*3/MM3 (ref 140–450)
PMV BLD AUTO: 9.9 FL (ref 6–12)
PROTHROMBIN TIME: 13.1 SECONDS (ref 11.4–14.4)
RBC # BLD AUTO: 2.41 10*6/MM3 (ref 3.77–5.28)
RH BLD: POSITIVE
RH BLD: POSITIVE
T&S EXPIRATION DATE: NORMAL
TIBC SERPL-MCNC: 468 MCG/DL (ref 298–536)
TRANSFERRIN SERPL-MCNC: 314 MG/DL (ref 200–360)
VIT B12 BLD-MCNC: 302 PG/ML (ref 211–946)
WBC NRBC COR # BLD: 6.85 10*3/MM3 (ref 3.4–10.8)

## 2023-01-13 PROCEDURE — 86923 COMPATIBILITY TEST ELECTRIC: CPT

## 2023-01-13 PROCEDURE — 36430 TRANSFUSION BLD/BLD COMPNT: CPT

## 2023-01-13 PROCEDURE — 82607 VITAMIN B-12: CPT | Performed by: STUDENT IN AN ORGANIZED HEALTH CARE EDUCATION/TRAINING PROGRAM

## 2023-01-13 PROCEDURE — 84466 ASSAY OF TRANSFERRIN: CPT | Performed by: STUDENT IN AN ORGANIZED HEALTH CARE EDUCATION/TRAINING PROGRAM

## 2023-01-13 PROCEDURE — 86900 BLOOD TYPING SEROLOGIC ABO: CPT | Performed by: STUDENT IN AN ORGANIZED HEALTH CARE EDUCATION/TRAINING PROGRAM

## 2023-01-13 PROCEDURE — 86900 BLOOD TYPING SEROLOGIC ABO: CPT

## 2023-01-13 PROCEDURE — 85730 THROMBOPLASTIN TIME PARTIAL: CPT | Performed by: STUDENT IN AN ORGANIZED HEALTH CARE EDUCATION/TRAINING PROGRAM

## 2023-01-13 PROCEDURE — 85025 COMPLETE CBC W/AUTO DIFF WBC: CPT | Performed by: STUDENT IN AN ORGANIZED HEALTH CARE EDUCATION/TRAINING PROGRAM

## 2023-01-13 PROCEDURE — 86901 BLOOD TYPING SEROLOGIC RH(D): CPT

## 2023-01-13 PROCEDURE — 99222 1ST HOSP IP/OBS MODERATE 55: CPT | Performed by: STUDENT IN AN ORGANIZED HEALTH CARE EDUCATION/TRAINING PROGRAM

## 2023-01-13 PROCEDURE — P9016 RBC LEUKOCYTES REDUCED: HCPCS

## 2023-01-13 PROCEDURE — 83540 ASSAY OF IRON: CPT | Performed by: STUDENT IN AN ORGANIZED HEALTH CARE EDUCATION/TRAINING PROGRAM

## 2023-01-13 PROCEDURE — 99213 OFFICE O/P EST LOW 20 MIN: CPT | Performed by: NURSE PRACTITIONER

## 2023-01-13 PROCEDURE — 93005 ELECTROCARDIOGRAM TRACING: CPT | Performed by: STUDENT IN AN ORGANIZED HEALTH CARE EDUCATION/TRAINING PROGRAM

## 2023-01-13 PROCEDURE — G0378 HOSPITAL OBSERVATION PER HR: HCPCS

## 2023-01-13 PROCEDURE — 86901 BLOOD TYPING SEROLOGIC RH(D): CPT | Performed by: STUDENT IN AN ORGANIZED HEALTH CARE EDUCATION/TRAINING PROGRAM

## 2023-01-13 PROCEDURE — 85610 PROTHROMBIN TIME: CPT | Performed by: STUDENT IN AN ORGANIZED HEALTH CARE EDUCATION/TRAINING PROGRAM

## 2023-01-13 PROCEDURE — 86850 RBC ANTIBODY SCREEN: CPT | Performed by: STUDENT IN AN ORGANIZED HEALTH CARE EDUCATION/TRAINING PROGRAM

## 2023-01-13 PROCEDURE — 82728 ASSAY OF FERRITIN: CPT | Performed by: STUDENT IN AN ORGANIZED HEALTH CARE EDUCATION/TRAINING PROGRAM

## 2023-01-13 PROCEDURE — 82746 ASSAY OF FOLIC ACID SERUM: CPT | Performed by: STUDENT IN AN ORGANIZED HEALTH CARE EDUCATION/TRAINING PROGRAM

## 2023-01-13 RX ORDER — SODIUM CHLORIDE 0.9 % (FLUSH) 0.9 %
10 SYRINGE (ML) INJECTION EVERY 12 HOURS SCHEDULED
Status: CANCELLED | OUTPATIENT
Start: 2023-01-13

## 2023-01-13 RX ORDER — ONDANSETRON 4 MG/1
4 TABLET, FILM COATED ORAL EVERY 6 HOURS PRN
Status: DISCONTINUED | OUTPATIENT
Start: 2023-01-13 | End: 2023-01-14 | Stop reason: HOSPADM

## 2023-01-13 RX ORDER — SODIUM CHLORIDE 9 MG/ML
40 INJECTION, SOLUTION INTRAVENOUS AS NEEDED
Status: CANCELLED | OUTPATIENT
Start: 2023-01-13

## 2023-01-13 RX ORDER — ACETAMINOPHEN 325 MG/1
650 TABLET ORAL EVERY 4 HOURS PRN
Status: DISCONTINUED | OUTPATIENT
Start: 2023-01-13 | End: 2023-01-14 | Stop reason: HOSPADM

## 2023-01-13 RX ORDER — CYCLOBENZAPRINE HCL 10 MG
5 TABLET ORAL ONCE
Status: DISCONTINUED | OUTPATIENT
Start: 2023-01-14 | End: 2023-01-14 | Stop reason: HOSPADM

## 2023-01-13 RX ORDER — ONDANSETRON 4 MG/1
4 TABLET, FILM COATED ORAL EVERY 6 HOURS PRN
Status: CANCELLED | OUTPATIENT
Start: 2023-01-13

## 2023-01-13 RX ORDER — ACETAMINOPHEN 325 MG/1
650 TABLET ORAL EVERY 4 HOURS PRN
Status: CANCELLED | OUTPATIENT
Start: 2023-01-13

## 2023-01-13 RX ORDER — SODIUM CHLORIDE 0.9 % (FLUSH) 0.9 %
10 SYRINGE (ML) INJECTION AS NEEDED
Status: CANCELLED | OUTPATIENT
Start: 2023-01-13

## 2023-01-13 RX ORDER — DIPHENHYDRAMINE HCL 25 MG
25 CAPSULE ORAL NIGHTLY PRN
Status: CANCELLED | OUTPATIENT
Start: 2023-01-13

## 2023-01-13 RX ORDER — SODIUM CHLORIDE 0.9 % (FLUSH) 0.9 %
10 SYRINGE (ML) INJECTION EVERY 12 HOURS SCHEDULED
Status: DISCONTINUED | OUTPATIENT
Start: 2023-01-13 | End: 2023-01-14 | Stop reason: HOSPADM

## 2023-01-13 RX ORDER — SODIUM CHLORIDE 9 MG/ML
40 INJECTION, SOLUTION INTRAVENOUS AS NEEDED
Status: DISCONTINUED | OUTPATIENT
Start: 2023-01-13 | End: 2023-01-14 | Stop reason: HOSPADM

## 2023-01-13 RX ORDER — SODIUM CHLORIDE 0.9 % (FLUSH) 0.9 %
10 SYRINGE (ML) INJECTION AS NEEDED
Status: DISCONTINUED | OUTPATIENT
Start: 2023-01-13 | End: 2023-01-14 | Stop reason: HOSPADM

## 2023-01-13 RX ORDER — DIPHENHYDRAMINE HCL 25 MG
25 CAPSULE ORAL NIGHTLY PRN
Status: DISCONTINUED | OUTPATIENT
Start: 2023-01-13 | End: 2023-01-14 | Stop reason: HOSPADM

## 2023-01-13 RX ORDER — FERROUS SULFATE 325(65) MG
325 TABLET ORAL
COMMUNITY
End: 2023-04-05

## 2023-01-13 RX ADMIN — Medication 10 ML: at 21:50

## 2023-01-13 NOTE — PROGRESS NOTES
Chief Complaint  Eliza Aden is a 22 y.o.  female presenting for Vaginal Bleeding (Persistent vaginal bleeding, patient states that HGB continues to decrease.  Dizziness.  C/O nausea (feels that this is related to birth control pill, taking in the A.M.).  Feels that bleeding decreased yesterday.)    History of Present Illness  Eliza is a pleasant 21yo nulligravid woman, back today because of persistent menorrhagia.  (She is not yet SA.  Engaged to be  this summer.) She has lifelong oligomenorrhea, then will have heavy and prolonged menses.  She was seen at Out pt clinic in [place] hometown  and had Hgb 11.9.  She had a normal pelvic US  (4mm EMS / no polyps or fibroids or ov cysts).  She was started on a BCP.  She was eval at [facility] ER on 23 and Hgb was 10.6.  When I saw her here on , her OCP was changed to Balcoltra.  It has just started to decrease the flow on Wednesday evening.  Yesterday & today is better (saturating a pad in ~ 4 hrs).  But, until Wed evening, since last visit here, she had been changing pads 2-3x/hr.  Her Hgb Wed (hometown) was 7.2.  She c/o SOA, feeling dizzy, and having a little mid-sternal chest pain x couple of days.  Extreme fatigue with any activity, even short ambulation.      The following portions of the patient's history were reviewed and updated as appropriate: allergies, current medications, past family history, past medical history, past social history, past surgical history and problem list.    No Known Allergies      Current Outpatient Medications:   •  ferrous sulfate 325 (65 FE) MG tablet, Take 325 mg by mouth 3 (Three) Times a Day With Meals., Disp: , Rfl:   •  ibuprofen (ADVIL,MOTRIN) 800 MG tablet, TAKE 1 TABLET BY MOUTH EVERY 8 HOURS AS NEEDED FOR DISCOMFORT, Disp: , Rfl:   •  Levonorgest-Eth Estrad-Fe Bisg (Balcoltra) 0.1-20 MG-MCG(21) per tablet, Take 1 tablet by mouth Daily., Disp: 28 tablet, Rfl: 12    Past Medical History:   Diagnosis  "Date   • Anxiety    • Asthma         Past Surgical History:   Procedure Laterality Date   • WISDOM TOOTH EXTRACTION         Objective  /80   Pulse 107   Ht 170.2 cm (67\")   Wt 118 kg (260 lb)   LMP 12/23/2022 (Exact Date)   SpO2 100%   Breastfeeding No   BMI 40.72 kg/m²     Physical Exam  Vitals and nursing note reviewed. Exam conducted with a chaperone present.   Constitutional:       Appearance: Normal appearance.   Cardiovascular:      Rate and Rhythm: Regular rhythm. Tachycardia present.      Heart sounds: No murmur heard.  Pulmonary:      Effort: Pulmonary effort is normal. No respiratory distress.      Breath sounds: Normal breath sounds.   Abdominal:      Palpations: Abdomen is soft. There is no mass.      Tenderness: There is no abdominal tenderness.   Genitourinary:     General: Normal vulva.      Labia:         Right: No rash, tenderness or lesion.         Left: No rash, tenderness or lesion.       Vagina: Bleeding present. No erythema.      Cervix: No cervical motion tenderness, discharge, lesion or erythema.      Uterus: Normal. Not enlarged and not tender.       Adnexa: Right adnexa normal and left adnexa normal.        Right: No mass or tenderness.          Left: No mass or tenderness.        Rectum: Normal.      Comments: Light amt of active bleeding today.  Pink mucosa intact.    Neurological:      Mental Status: She is alert.         Assessment/Plan   Diagnoses and all orders for this visit:    1. Menorrhagia with irregular cycle (Primary)    2. Anemia due to acute blood loss    Consulted with out on-call physician.    Will be direct-admit to floor.  Dr. Acuña will order labs & blood.  (Plan for 2 units)  Procedures            No follow-ups on file.    Laisha Russell, APRN  01/13/2023  "

## 2023-01-13 NOTE — H&P
[facility]  Callie Shani Aden  : 2000  MRN: [id number]  CSN: [id number]    Consult Requested By:    Consulting Service: Gynecology   Reason for Consultation:    Date of consultation: 2023       Malcolm Aden is a 22 y.o. year old  who presents as a direct admit from APRN clinic due to acute blood loss, symptomatic anemia secondary to AUB. Patient reports her bleeding episode started 22, and steadily increased to the point she was seen in the ER on 22. Her Hgb at that time was 10.6. She was seen in APRN clinic the following day and started on Balcoltra. However, during the time from  to today, she reports having extreme vaginal bleeding to the point where she was filling more than 4 pads in 1 hour with large clots the size of her palm.  Today she reports trouble walking, increased shortness of breath, extreme fatigue and dizziness.  She was seen in the ER back home in [place], and was told her hemoglobin has gone from 10.6 to 7.2, and was tachycardic up to the 130s-160s.  Today her bleeding is much better controlled after 1 week on the Balcoltra, and has only needed to change her tampon about 4 times in 1 day.  However due to severe symptomatic anemia with acute blood loss, would recommend admission for observation and blood transfusion.  Patient and mother in agreement with plan of care.     Past Medical History:   Diagnosis Date   • Anxiety    • Asthma      Past Surgical History:   Procedure Laterality Date   • WISDOM TOOTH EXTRACTION       OB History    Para Term  AB Living   0 0 0 0 0 0   SAB IAB Ectopic Molar Multiple Live Births   0 0 0 0 0 0     Social History    Tobacco Use      Smoking status: Never      Smokeless tobacco: Never      Current Facility-Administered Medications:   •  acetaminophen (TYLENOL) tablet 650 mg, 650 mg, Oral, Q4H PRN, Karlene Acuña MD  •  diphenhydrAMINE (BENADRYL) capsule 25 mg, 25 mg, Oral, Nightly PRN,  Karlene Acuña MD  •  [START ON 1/14/2023] Levonorgest-Eth Estrad-Fe Bisg (BALCOLTRA) 0.1-20 MG-MCG(21) per tablet **PATIENT-SUPPLIED MED**, 1 tablet, Oral, Daily, Karlene Acuña MD  •  ondansetron (ZOFRAN) tablet 4 mg, 4 mg, Oral, Q6H PRN, Karlene Acuña MD  •  sodium chloride 0.9 % flush 10 mL, 10 mL, Intravenous, Q12H, Karlene Acuña MD  •  sodium chloride 0.9 % flush 10 mL, 10 mL, Intravenous, PRN, Karlene Acuña MD  •  sodium chloride 0.9 % infusion 40 mL, 40 mL, Intravenous, PRN, Karlene Acuña MD    No Known Allergies    Review of Systems   Constitutional: Positive for fatigue.   Genitourinary: Positive for vaginal bleeding.   Neurological: Positive for dizziness and weakness.   All other systems reviewed and are negative.        Objective   /65   Pulse 107   Temp 98.6 °F (37 °C) (Temporal)   Resp 18   LMP 12/23/2022 (Exact Date)   SpO2 98%   General: well developed; well nourished  no acute distress  obese - There is no height or weight on file to calculate BMI.   Heart: Not performed.   Lungs: breathing is unlabored   Abdomen: soft, non-tender; no masses  no umbilical or inguinal hernias are present   Pelvis: Deferred    Labs  CBC:   Lab Results   Component Value Date    WBC 6.85 01/13/2023    HGB 6.6 (C) 01/13/2023    HCT 21.5 (L) 01/13/2023     01/13/2023       Imaging Reviewed  None        Assessment   1. Symptomatic anemia   2. Acute blood loss anemia   3. AUB      Plan   1. Admit for repeat CBC, and plan for transfusion of 2u PRBCs ASAP with post transfusion Hgb ordered.   2. Coagulation studies and von Willebrand panel ordered, as well.   3. Vital signs currently stable and patient much improved from baseline.       Karlene Acuña MD  1/13/2023  15:32 EST

## 2023-01-14 VITALS
DIASTOLIC BLOOD PRESSURE: 69 MMHG | OXYGEN SATURATION: 98 % | TEMPERATURE: 98.2 F | HEART RATE: 106 BPM | SYSTOLIC BLOOD PRESSURE: 108 MMHG | RESPIRATION RATE: 18 BRPM

## 2023-01-14 LAB
BASOPHILS # BLD AUTO: 0.02 10*3/MM3 (ref 0–0.2)
BASOPHILS NFR BLD AUTO: 0.3 % (ref 0–1.5)
BH BB BLOOD EXPIRATION DATE: NORMAL
BH BB BLOOD EXPIRATION DATE: NORMAL
BH BB BLOOD TYPE BARCODE: 6200
BH BB BLOOD TYPE BARCODE: 6200
BH BB DISPENSE STATUS: NORMAL
BH BB DISPENSE STATUS: NORMAL
BH BB PRODUCT CODE: NORMAL
BH BB PRODUCT CODE: NORMAL
BH BB UNIT NUMBER: NORMAL
BH BB UNIT NUMBER: NORMAL
CROSSMATCH INTERPRETATION: NORMAL
CROSSMATCH INTERPRETATION: NORMAL
DEPRECATED RDW RBC AUTO: 47.5 FL (ref 37–54)
EOSINOPHIL # BLD AUTO: 0.06 10*3/MM3 (ref 0–0.4)
EOSINOPHIL NFR BLD AUTO: 1 % (ref 0.3–6.2)
ERYTHROCYTE [DISTWIDTH] IN BLOOD BY AUTOMATED COUNT: 15.1 % (ref 12.3–15.4)
HCT VFR BLD AUTO: 28.2 % (ref 34–46.6)
HGB BLD-MCNC: 8.7 G/DL (ref 12–15.9)
IMM GRANULOCYTES # BLD AUTO: 0.03 10*3/MM3 (ref 0–0.05)
IMM GRANULOCYTES NFR BLD AUTO: 0.5 % (ref 0–0.5)
LYMPHOCYTES # BLD AUTO: 2.24 10*3/MM3 (ref 0.7–3.1)
LYMPHOCYTES NFR BLD AUTO: 37.8 % (ref 19.6–45.3)
MCH RBC QN AUTO: 26.9 PG (ref 26.6–33)
MCHC RBC AUTO-ENTMCNC: 30.9 G/DL (ref 31.5–35.7)
MCV RBC AUTO: 87.3 FL (ref 79–97)
MONOCYTES # BLD AUTO: 0.34 10*3/MM3 (ref 0.1–0.9)
MONOCYTES NFR BLD AUTO: 5.7 % (ref 5–12)
NEUTROPHILS NFR BLD AUTO: 3.23 10*3/MM3 (ref 1.7–7)
NEUTROPHILS NFR BLD AUTO: 54.7 % (ref 42.7–76)
NRBC BLD AUTO-RTO: 0.3 /100 WBC (ref 0–0.2)
PLATELET # BLD AUTO: 279 10*3/MM3 (ref 140–450)
PMV BLD AUTO: 9.8 FL (ref 6–12)
RBC # BLD AUTO: 3.23 10*6/MM3 (ref 3.77–5.28)
UNIT  ABO: NORMAL
UNIT  ABO: NORMAL
UNIT  RH: NORMAL
UNIT  RH: NORMAL
WBC NRBC COR # BLD: 5.92 10*3/MM3 (ref 3.4–10.8)

## 2023-01-14 PROCEDURE — 85240 CLOT FACTOR VIII AHG 1 STAGE: CPT | Performed by: STUDENT IN AN ORGANIZED HEALTH CARE EDUCATION/TRAINING PROGRAM

## 2023-01-14 PROCEDURE — G0378 HOSPITAL OBSERVATION PER HR: HCPCS

## 2023-01-14 PROCEDURE — 99238 HOSP IP/OBS DSCHRG MGMT 30/<: CPT | Performed by: STUDENT IN AN ORGANIZED HEALTH CARE EDUCATION/TRAINING PROGRAM

## 2023-01-14 PROCEDURE — 93010 ELECTROCARDIOGRAM REPORT: CPT | Performed by: INTERNAL MEDICINE

## 2023-01-14 PROCEDURE — 85025 COMPLETE CBC W/AUTO DIFF WBC: CPT | Performed by: STUDENT IN AN ORGANIZED HEALTH CARE EDUCATION/TRAINING PROGRAM

## 2023-01-14 PROCEDURE — 85246 CLOT FACTOR VIII VW ANTIGEN: CPT | Performed by: STUDENT IN AN ORGANIZED HEALTH CARE EDUCATION/TRAINING PROGRAM

## 2023-01-14 PROCEDURE — 85245 CLOT FACTOR VIII VW RISTOCTN: CPT | Performed by: STUDENT IN AN ORGANIZED HEALTH CARE EDUCATION/TRAINING PROGRAM

## 2023-01-14 RX ADMIN — Medication 10 ML: at 08:49

## 2023-01-14 RX ADMIN — LEVONORGESTREL AND ETHINYL ESTRADIOL 1 TABLET: KIT at 08:49

## 2023-01-14 NOTE — DISCHARGE SUMMARY
[facility]   Callie Shani Aden  : 2000  MRN: [id number]  CSN: [id number]    Discharge Summary    A formal discharge summary was not needed because this admission was for an observation visit.    She presents as a direct admit from clinic for acute blood loss, symptomatic anemia from HMB. She received 2u PRBCs, with an improvement in her Hgb from 6.6 to 8.7. Patient reports feeling much improved, and is ready to go home on hospital day 1. Continue current OCP for bleeding control and follow up in 1-2 weeks or sooner PRN.     Discharge Date: 23    Discharge Dx:    1. Symptomatic anemia    Disposition: home   Condition at discharge: stable   Follow up: 1-2 weeks in clinic          This note has been electronically signed.    Karlene Acuña MD

## 2023-01-14 NOTE — PLAN OF CARE
Goal Outcome Evaluation:  Plan of Care Reviewed With: patient, mother        Progress: improving  Outcome Evaluation: VSS stable with elevated HR. Critical H&H values led to PRBC infusions. After typing and blood work, first unit is being tolerated well. Pt has consumed good nutrition and is in good spirits. Mother is at side. Continue to monitor and report as needed.

## 2023-01-16 ENCOUNTER — TELEPHONE (OUTPATIENT)
Dept: OBSTETRICS AND GYNECOLOGY | Facility: CLINIC | Age: 23
End: 2023-01-16
Payer: COMMERCIAL

## 2023-01-16 NOTE — TELEPHONE ENCOUNTER
----- Message from Maritza Steward sent at 1/16/2023 11:14 AM EST -----  Regarding: FW: follow up with [facility]    ----- Message -----  From: Karlene Acuña MD  Sent: 1/14/2023   9:37 AM EST  To: Leonides Garsia  [place]  Subject: follow up with Drew [facility]                         Please make this patient a follow up appointment with STARLA Cormier in the next 1-2 weeks. Thanks!  Karlene Acuña MD

## 2023-01-17 LAB
FACT VIII ACT/NOR PPP: 188 % (ref 56–140)
PATH INTERP BLD-IMP: NORMAL
VWF AG ACT/NOR PPP IA: 130 % (ref 50–200)
VWF:RCO ACT/NOR PPP PL AGG: 141 % (ref 50–200)

## 2023-01-19 ENCOUNTER — LAB (OUTPATIENT)
Dept: LAB | Facility: HOSPITAL | Age: 23
End: 2023-01-19
Payer: COMMERCIAL

## 2023-01-19 ENCOUNTER — OFFICE VISIT (OUTPATIENT)
Dept: OBSTETRICS AND GYNECOLOGY | Facility: CLINIC | Age: 23
End: 2023-01-19
Payer: COMMERCIAL

## 2023-01-19 VITALS
BODY MASS INDEX: 40.81 KG/M2 | WEIGHT: 260 LBS | DIASTOLIC BLOOD PRESSURE: 82 MMHG | SYSTOLIC BLOOD PRESSURE: 124 MMHG | HEIGHT: 67 IN

## 2023-01-19 DIAGNOSIS — R89.9 ABNORMAL LABORATORY TEST RESULT: Primary | ICD-10-CM

## 2023-01-19 DIAGNOSIS — R89.9 ABNORMAL LABORATORY TEST RESULT: ICD-10-CM

## 2023-01-19 DIAGNOSIS — D64.9 SYMPTOMATIC ANEMIA: ICD-10-CM

## 2023-01-19 PROCEDURE — 85247 CLOT FACTOR VIII MULTIMETRIC: CPT

## 2023-01-19 PROCEDURE — 99212 OFFICE O/P EST SF 10 MIN: CPT | Performed by: NURSE PRACTITIONER

## 2023-01-19 PROCEDURE — 85240 CLOT FACTOR VIII AHG 1 STAGE: CPT

## 2023-01-19 PROCEDURE — 85246 CLOT FACTOR VIII VW ANTIGEN: CPT

## 2023-01-19 PROCEDURE — 36415 COLL VENOUS BLD VENIPUNCTURE: CPT

## 2023-01-19 PROCEDURE — 85245 CLOT FACTOR VIII VW RISTOCTN: CPT

## 2023-01-19 NOTE — PROGRESS NOTES
"Chief Complaint  Eliza Aden is a 22 y.o.  female presenting for Follow-up (Follow-up after blood transfusion.  ) and Results (Discuss lab results.)    History of Present Illness  Eliza is a very pleasant 21yo nulligravid woman, here for 1 wk follow-up of a week long episode of hemorrhage.  (She had fallen to a Hgb of 6.6 when she was admitted last week for 2 units PRBC.  Hgb was 8.7 at discharge.  She tolerated the transfusions well without complications.)  She feels so much better today.  Still feels a little tired, but with Hgb 9.2 yesterday @ PCP.  She has no dizziness, SOA, CP.  The bleeding has stopped in the past week.  She had sl elevated Factor VIII testing, and is to have that repeated today.  No ACHES on the Balcoltra.  Will skip the placebo pills in this pack only.      The following portions of the patient's history were reviewed and updated as appropriate: allergies, current medications, past family history, past medical history, past social history, past surgical history and problem list.    No Known Allergies      Current Outpatient Medications:   •  ferrous sulfate 325 (65 FE) MG tablet, Take 325 mg by mouth 3 (Three) Times a Day With Meals., Disp: , Rfl:   •  ibuprofen (ADVIL,MOTRIN) 800 MG tablet, TAKE 1 TABLET BY MOUTH EVERY 8 HOURS AS NEEDED FOR DISCOMFORT, Disp: , Rfl:   •  Levonorgest-Eth Estrad-Fe Bisg (Balcoltra) 0.1-20 MG-MCG(21) per tablet, Take 1 tablet by mouth Daily., Disp: 28 tablet, Rfl: 12    Past Medical History:   Diagnosis Date   • Anxiety    • Asthma         Past Surgical History:   Procedure Laterality Date   • WISDOM TOOTH EXTRACTION         Objective  /82   Ht 170.2 cm (67\")   Wt 118 kg (260 lb)   LMP 2022 (Exact Date)   Breastfeeding No   BMI 40.72 kg/m²     Physical Exam  Vitals and nursing note reviewed.   Constitutional:       Appearance: Normal appearance.   Cardiovascular:      Rate and Rhythm: Normal rate and regular rhythm.      Heart " sounds: No murmur heard.  Pulmonary:      Effort: Pulmonary effort is normal. No respiratory distress.      Breath sounds: Normal breath sounds.   Skin:     General: Skin is warm and dry.   Neurological:      Mental Status: She is alert and oriented to person, place, and time.   Psychiatric:         Mood and Affect: Mood normal.         Behavior: Behavior normal.     Pelvic exam deferred, as the bleeding has ceased.    Assessment/Plan   Diagnoses and all orders for this visit:    1. Abnormal laboratory test result (Primary)  -     Von Willebrand Factor Activity; Future    We discussed possibility of needing to change methods if she has a thrombophilia / genetic mutation making her at increased risk for thromboembolic events.  But, continue the Balcoltra for now.    Repeat labs today & call pt with results.    Procedures            Return in about 3 months (around 4/19/2023) for Recheck.    Laisha Russell, APRN  01/19/2023

## 2023-01-21 LAB
FACT VIII ACT/NOR PPP: 215 % (ref 56–140)
VWF AG ACT/NOR PPP IA: 126 % (ref 50–200)
VWF:RCO ACT/NOR PPP PL AGG: 115 % (ref 50–200)

## 2023-01-23 DIAGNOSIS — R58 HEMORRHAGE: Primary | ICD-10-CM

## 2023-01-23 LAB
QT INTERVAL: 376 MS
QTC INTERVAL: 457 MS

## 2023-01-26 LAB — VWF MULTIMERS PPP IB: NORMAL

## 2023-02-06 ENCOUNTER — CONSULT (OUTPATIENT)
Dept: ONCOLOGY | Facility: CLINIC | Age: 23
End: 2023-02-06
Payer: COMMERCIAL

## 2023-02-06 ENCOUNTER — LAB (OUTPATIENT)
Dept: LAB | Facility: HOSPITAL | Age: 23
End: 2023-02-06
Payer: COMMERCIAL

## 2023-02-06 VITALS
DIASTOLIC BLOOD PRESSURE: 98 MMHG | HEART RATE: 110 BPM | BODY MASS INDEX: 40.49 KG/M2 | OXYGEN SATURATION: 99 % | RESPIRATION RATE: 20 BRPM | HEIGHT: 67 IN | TEMPERATURE: 97.7 F | SYSTOLIC BLOOD PRESSURE: 155 MMHG | WEIGHT: 258 LBS

## 2023-02-06 DIAGNOSIS — N92.1 MENORRHAGIA WITH IRREGULAR CYCLE: ICD-10-CM

## 2023-02-06 DIAGNOSIS — D64.9 SYMPTOMATIC ANEMIA: ICD-10-CM

## 2023-02-06 DIAGNOSIS — D64.9 SYMPTOMATIC ANEMIA: Primary | ICD-10-CM

## 2023-02-06 LAB
ALBUMIN SERPL-MCNC: 4.1 G/DL (ref 3.5–5.2)
ALBUMIN/GLOB SERPL: 1.1 G/DL
ALP SERPL-CCNC: 55 U/L (ref 39–117)
ALT SERPL W P-5'-P-CCNC: 13 U/L (ref 1–33)
ANION GAP SERPL CALCULATED.3IONS-SCNC: 12 MMOL/L (ref 5–15)
AST SERPL-CCNC: 15 U/L (ref 1–32)
BASOPHILS # BLD AUTO: 0.03 10*3/MM3 (ref 0–0.2)
BASOPHILS NFR BLD AUTO: 0.3 % (ref 0–1.5)
BILIRUB SERPL-MCNC: <0.2 MG/DL (ref 0–1.2)
BUN SERPL-MCNC: 10 MG/DL (ref 6–20)
BUN/CREAT SERPL: 13.7 (ref 7–25)
CALCIUM SPEC-SCNC: 8.9 MG/DL (ref 8.6–10.5)
CHLORIDE SERPL-SCNC: 107 MMOL/L (ref 98–107)
CLOSURE TME COLL+ADP BLD: 52 SECONDS (ref 54–123)
CLOSURE TME COLL+EPINEP BLD: 137 SECONDS (ref 72–192)
CO2 SERPL-SCNC: 22 MMOL/L (ref 22–29)
CREAT SERPL-MCNC: 0.73 MG/DL (ref 0.57–1)
DEPRECATED RDW RBC AUTO: 43.1 FL (ref 37–54)
EGFRCR SERPLBLD CKD-EPI 2021: 120.2 ML/MIN/1.73
EOSINOPHIL # BLD AUTO: 0.1 10*3/MM3 (ref 0–0.4)
EOSINOPHIL NFR BLD AUTO: 1.1 % (ref 0.3–6.2)
ERYTHROCYTE [DISTWIDTH] IN BLOOD BY AUTOMATED COUNT: 14.2 % (ref 12.3–15.4)
GLOBULIN UR ELPH-MCNC: 3.6 GM/DL
GLUCOSE SERPL-MCNC: 116 MG/DL (ref 65–99)
HCT VFR BLD AUTO: 36.7 % (ref 34–46.6)
HGB BLD-MCNC: 10.9 G/DL (ref 12–15.9)
IMM GRANULOCYTES # BLD AUTO: 0.05 10*3/MM3 (ref 0–0.05)
IMM GRANULOCYTES NFR BLD AUTO: 0.5 % (ref 0–0.5)
LYMPHOCYTES # BLD AUTO: 2.99 10*3/MM3 (ref 0.7–3.1)
LYMPHOCYTES NFR BLD AUTO: 32.9 % (ref 19.6–45.3)
MCH RBC QN AUTO: 24.8 PG (ref 26.6–33)
MCHC RBC AUTO-ENTMCNC: 29.7 G/DL (ref 31.5–35.7)
MCV RBC AUTO: 83.4 FL (ref 79–97)
MONOCYTES # BLD AUTO: 0.59 10*3/MM3 (ref 0.1–0.9)
MONOCYTES NFR BLD AUTO: 6.5 % (ref 5–12)
NEUTROPHILS NFR BLD AUTO: 5.34 10*3/MM3 (ref 1.7–7)
NEUTROPHILS NFR BLD AUTO: 58.7 % (ref 42.7–76)
NRBC BLD AUTO-RTO: 0 /100 WBC (ref 0–0.2)
PLATELET # BLD AUTO: 326 10*3/MM3 (ref 140–450)
PMV BLD AUTO: 10.3 FL (ref 6–12)
POTASSIUM SERPL-SCNC: 4.2 MMOL/L (ref 3.5–5.2)
PROT SERPL-MCNC: 7.7 G/DL (ref 6–8.5)
RBC # BLD AUTO: 4.4 10*6/MM3 (ref 3.77–5.28)
SODIUM SERPL-SCNC: 141 MMOL/L (ref 136–145)
WBC NRBC COR # BLD: 9.1 10*3/MM3 (ref 3.4–10.8)

## 2023-02-06 PROCEDURE — 85576 BLOOD PLATELET AGGREGATION: CPT

## 2023-02-06 PROCEDURE — 85025 COMPLETE CBC W/AUTO DIFF WBC: CPT

## 2023-02-06 PROCEDURE — 80053 COMPREHEN METABOLIC PANEL: CPT

## 2023-02-06 PROCEDURE — 36415 COLL VENOUS BLD VENIPUNCTURE: CPT

## 2023-02-06 PROCEDURE — 99205 OFFICE O/P NEW HI 60 MIN: CPT | Performed by: INTERNAL MEDICINE

## 2023-02-06 NOTE — PROGRESS NOTES
[facility] [address] [facility]    [address] [place] [address]    Phone:  [phone]                                       After Visit Summary   2/11/2017    Nel Farmer    MRN: [id number]           After Visit Summary Signature Page     I have received my discharge instructions, and my questions have been answered. I have discussed any challenges I see with this plan with the nurse or doctor.    ..........................................................................................................................................  Patient/Patient Representative Signature      ..........................................................................................................................................  Patient Representative Print Name and Relationship to Patient    ..................................................               ................................................  Date                                            Time    ..........................................................................................................................................  Reviewed by Signature/Title    ...................................................              ..............................................  Date                                                            Time           CHIEF COMPLAINT: No current complaints    REASON FOR REFERRAL: Heavy menses with large clot passage      RECORDS OBTAINED  Records of the patients history including those obtained from patient and hospital labs were reviewed and summarized in detail.    Oncology/Hematology History    No history exists.       HISTORY OF PRESENT ILLNESS:  The patient is a 22 y.o.  female, referred for heavy menses with severe anemia as a result with iron deficiency as a result and subsequent testing of hematologic panels including von Willebrand's panel and coagulation studies all of which were normal.  Sent to me because of the coincidental finding of an elevated Factor VIII activity.  Was told that she should not take birth control pills but I do not concur with that opinion relative to the risk of pregnancy and the risk of bleeding proportional to the degree of her risk of excess clotting from this and very weak procoagulant setting.    REVIEW OF SYSTEMS:  No somatic complaints    Past Medical History:   Diagnosis Date   • Anxiety    • Asthma      Past Surgical History:   Procedure Laterality Date   • WISDOM TOOTH EXTRACTION         Current Outpatient Medications on File Prior to Visit   Medication Sig Dispense Refill   • ferrous sulfate 325 (65 FE) MG tablet Take 325 mg by mouth 3 (Three) Times a Day With Meals.     • ibuprofen (ADVIL,MOTRIN) 800 MG tablet TAKE 1 TABLET BY MOUTH EVERY 8 HOURS AS NEEDED FOR DISCOMFORT     • Levonorgest-Eth Estrad-Fe Bisg (Balcoltra) 0.1-20 MG-MCG(21) per tablet Take 1 tablet by mouth Daily. 28 tablet 12     No current facility-administered medications on file prior to visit.       No Known Allergies    Social History     Socioeconomic History   • Marital status: Single   Tobacco Use   • Smoking status: Never   • Smokeless tobacco: Never   Vaping Use   • Vaping Use: Never used   Substance and Sexual Activity   • Alcohol use: Never   • Drug use: Never   • Sexual activity: Never       Family History  "  Problem Relation Age of Onset   • No Known Problems Mother    • Heart attack Father    • Hypertension Father    • Hyperlipidemia Father    • Ovarian cancer Maternal Grandmother    • Hypertension Maternal Grandfather    • Hyperlipidemia Maternal Grandfather    • Heart disease Maternal Grandfather    • No Known Problems Paternal Grandmother    • Heart attack Paternal Grandfather        PHYSICAL EXAM:  Good skin turgor.  Normal skin tone.  No jaundice or icterus.    /98   Pulse 110   Temp 97.7 °F (36.5 °C)   Resp 20   Ht 170.2 cm (67\")   Wt 117 kg (258 lb)   SpO2 99%   BMI 40.41 kg/m²     ECOG score: 0           ECOG: (0) Fully Active - Able to Carry On All Pre-disease Performance Without Restriction    Lab Results   Component Value Date    HGB 8.7 (L) 01/14/2023    HCT 28.2 (L) 01/14/2023    MCV 87.3 01/14/2023     01/14/2023    WBC 5.92 01/14/2023    NEUTROABS 3.23 01/14/2023    LYMPHSABS 2.24 01/14/2023    MONOSABS 0.34 01/14/2023    EOSABS 0.06 01/14/2023    BASOSABS 0.02 01/14/2023     No results found for: GLUCOSE, BUN, CREATININE, NA, K, CL, CO2, CALCIUM, PROTEINTOT, ALBUMIN, BILITOT, ALKPHOS, AST, ALT      Assessment & Plan      1.  Severe anemia due to dysfunctional uterine bleeding with elevated Factor VIII activity 215% and normal von Willebrand factor activity 115% with normal von Willebrand antigen 126% and normal von Willebrand multimer analysis with normal von Willebrand factor ristocetin cofactor assay these results are not consistent with von Willebrand's disease.  Elevated Factor VIII antigen and or activity is a risk factor for venous thrombosis rather than excessive bleeding.  Baseline PT and PTT goes against the likelihood of coagulation factor deficit.  Ferritin in January was at the low end of normal 16 with iron low 21 and normal total iron binding capacity consistent with iron deficiency of heavy menses.  Hemoglobin 1/4/2023 was 10.6 down to 6.6 on 1/13/2023 up to 8.7 with " transfusions and normal MCV and the rest of her counts are normal.  Doubt hematologic cause for the heavy menses.  I will check platelet function assay but if that is normal and she still having heavy periods then from a hematology standpoint, while I doubt there being a hematologic cause, I would refer her to  to the pediatric hematology program where they deal with platelet storage pool defects and other more rare conditions where they might have additional diagnostic procedures to go through but I am doubtful of that diagnosis.  She has not had joint bleeds or soft tissue bleeds and no other major bleeding with trauma that she is aware of.  No significant family history of such either.  She has not had heavy periods in the past but there was concern because she had so much clot that somehow this Factor VIII activity elevation had caused her to have the passage of clot.  There is no data to suggest that the modest hypercoagulability of Factor VIII activity elevation would cause any major propensity towards clotting though there is a modest risk.  I would not let the elevation in her Factor VIII activity keep her from getting appropriate hormonal therapy to regulate her periods as her risk of clotting would be much higher with pregnancy and also her risk of side effects from heavy menses to the degree that she experienced out of the blue back in January needs to be mitigated and is almost assuredly related to her hormonal milieu and not underlying coagulopathy or platelet dysfunction.  I will check her platelet function assay and if that shows any abnormality then I will likely send her to  pediatric hematology for an assessment of storage pool defects and other issues that are more of their purview though I think the odds of that is very low as she has no family history of this.  What she does have is a father who  in his 30s of a heart attack.  Could that have been related to the elevated factor VIII that  is possible but generally the propensity towards clotting from increased clotting factor activity or quantity is debated as to how procoagulant that actually is and generally causes venous clot not passage of large clot from heavy menses.  I would not let her elevated factor VIII activity keep her from traveling or normal routine of life in the future but in the near term, I told her that she may not travel to [place] until she gets through a few periods without massive bleeding as she just went through as she has not had a period since the hemoglobin in the sixes.  I will check a blood count today with her platelet function assay and go over all this with her when I see her back in a few weeks.  Also told her that she should be on iron replacement.  I usually give ferrous sulfate 325 mg twice a day every other day and if she does take it acid blocking medications to take vitamin C with that.  I will defer that to her gynecologist to decide.    Total time of care today discussing this complex decision tree with the patient and discussing the pathophysiology of clotting and bleeding and work-up thereof and after visit instituting the plan as outlined above took 1 hour of patient care time throughout the day today.    Tin Cortes MD    2/6/2023

## 2023-03-03 ENCOUNTER — LAB (OUTPATIENT)
Dept: LAB | Facility: HOSPITAL | Age: 23
End: 2023-03-03
Payer: COMMERCIAL

## 2023-03-03 ENCOUNTER — OFFICE VISIT (OUTPATIENT)
Dept: ONCOLOGY | Facility: CLINIC | Age: 23
End: 2023-03-03
Payer: COMMERCIAL

## 2023-03-03 VITALS
OXYGEN SATURATION: 98 % | HEART RATE: 102 BPM | SYSTOLIC BLOOD PRESSURE: 155 MMHG | BODY MASS INDEX: 40.97 KG/M2 | RESPIRATION RATE: 18 BRPM | WEIGHT: 261 LBS | TEMPERATURE: 98 F | HEIGHT: 67 IN | DIASTOLIC BLOOD PRESSURE: 90 MMHG

## 2023-03-03 DIAGNOSIS — D69.1 ABNORMAL PLATELET FUNCTION TEST: ICD-10-CM

## 2023-03-03 DIAGNOSIS — D64.9 SYMPTOMATIC ANEMIA: ICD-10-CM

## 2023-03-03 DIAGNOSIS — D64.9 SYMPTOMATIC ANEMIA: Primary | ICD-10-CM

## 2023-03-03 LAB
BASOPHILS # BLD AUTO: 0.02 10*3/MM3 (ref 0–0.2)
BASOPHILS NFR BLD AUTO: 0.3 % (ref 0–1.5)
CLOSURE TME COLL+ADP BLD: 80 SECONDS (ref 54–123)
CLOSURE TME COLL+EPINEP BLD: 122 SECONDS (ref 72–192)
DEPRECATED RDW RBC AUTO: 44.2 FL (ref 37–54)
EOSINOPHIL # BLD AUTO: 0.08 10*3/MM3 (ref 0–0.4)
EOSINOPHIL NFR BLD AUTO: 1.1 % (ref 0.3–6.2)
ERYTHROCYTE [DISTWIDTH] IN BLOOD BY AUTOMATED COUNT: 15.5 % (ref 12.3–15.4)
FERRITIN SERPL-MCNC: 9.73 NG/ML (ref 13–150)
HCT VFR BLD AUTO: 36.2 % (ref 34–46.6)
HGB BLD-MCNC: 10.9 G/DL (ref 12–15.9)
IMM GRANULOCYTES # BLD AUTO: 0.04 10*3/MM3 (ref 0–0.05)
IMM GRANULOCYTES NFR BLD AUTO: 0.6 % (ref 0–0.5)
IRON 24H UR-MRATE: 28 MCG/DL (ref 37–145)
IRON SATN MFR SERPL: 5 % (ref 20–50)
LYMPHOCYTES # BLD AUTO: 2.2 10*3/MM3 (ref 0.7–3.1)
LYMPHOCYTES NFR BLD AUTO: 30.5 % (ref 19.6–45.3)
MCH RBC QN AUTO: 23.7 PG (ref 26.6–33)
MCHC RBC AUTO-ENTMCNC: 30.1 G/DL (ref 31.5–35.7)
MCV RBC AUTO: 78.7 FL (ref 79–97)
MONOCYTES # BLD AUTO: 0.44 10*3/MM3 (ref 0.1–0.9)
MONOCYTES NFR BLD AUTO: 6.1 % (ref 5–12)
NEUTROPHILS NFR BLD AUTO: 4.44 10*3/MM3 (ref 1.7–7)
NEUTROPHILS NFR BLD AUTO: 61.4 % (ref 42.7–76)
PLATELET # BLD AUTO: 246 10*3/MM3 (ref 140–450)
PMV BLD AUTO: 10.1 FL (ref 6–12)
RBC # BLD AUTO: 4.6 10*6/MM3 (ref 3.77–5.28)
TIBC SERPL-MCNC: 562 MCG/DL (ref 298–536)
TRANSFERRIN SERPL-MCNC: 377 MG/DL (ref 200–360)
WBC NRBC COR # BLD: 7.22 10*3/MM3 (ref 3.4–10.8)

## 2023-03-03 PROCEDURE — 36415 COLL VENOUS BLD VENIPUNCTURE: CPT

## 2023-03-03 PROCEDURE — 85576 BLOOD PLATELET AGGREGATION: CPT

## 2023-03-03 PROCEDURE — 84466 ASSAY OF TRANSFERRIN: CPT

## 2023-03-03 PROCEDURE — 85025 COMPLETE CBC W/AUTO DIFF WBC: CPT

## 2023-03-03 PROCEDURE — 82728 ASSAY OF FERRITIN: CPT

## 2023-03-03 PROCEDURE — 99215 OFFICE O/P EST HI 40 MIN: CPT | Performed by: INTERNAL MEDICINE

## 2023-03-03 PROCEDURE — 83540 ASSAY OF IRON: CPT

## 2023-03-03 RX ORDER — FERROUS SULFATE 325(65) MG
TABLET ORAL
Qty: 30 TABLET | Refills: 11 | Status: SHIPPED | OUTPATIENT
Start: 2023-03-03

## 2023-03-03 NOTE — ADDENDUM NOTE
Addended by: ITZEL MCCRACKEN on: 3/3/2023 09:51 AM     Modules accepted: Orders, Level of Service

## 2023-03-03 NOTE — PROGRESS NOTES
"CHIEF COMPLAINT: Heavy menses    Problem List:  Oncology/Hematology History    No history exists.       HISTORY OF PRESENT ILLNESS:  The patient is a 22 y.o. female, here for follow up on management of heavy menses with abnormal platelet function assay as outlined below she had 1 period since last I saw her.  For some reason she stopped taking her iron when her prescription ran out though I had asked her to get with her gynecologist to get refills.    Past Medical History:   Diagnosis Date   • Anxiety    • Asthma      Past Surgical History:   Procedure Laterality Date   • WISDOM TOOTH EXTRACTION         No Known Allergies    Family History and Social History reviewed and changed as necessary    REVIEW OF SYSTEM:   No new somatic complaints    PHYSICAL EXAM:  No jaundice or icterus or pallor    Vitals:    03/03/23 0928   Height: 170.2 cm (67\")     There were no vitals filed for this visit.       ECOG score: 0           Vitals reviewed.    ECOG: (0) Fully Active - Able to Carry On All Pre-disease Performance Without Restriction    Lab Results   Component Value Date    HGB 10.9 (L) 02/06/2023    HCT 36.7 02/06/2023    MCV 83.4 02/06/2023     02/06/2023    WBC 9.10 02/06/2023    NEUTROABS 5.34 02/06/2023    LYMPHSABS 2.99 02/06/2023    MONOSABS 0.59 02/06/2023    EOSABS 0.10 02/06/2023    BASOSABS 0.03 02/06/2023       Lab Results   Component Value Date    GLUCOSE 116 (H) 02/06/2023    BUN 10 02/06/2023    CREATININE 0.73 02/06/2023     02/06/2023    K 4.2 02/06/2023     02/06/2023    CO2 22.0 02/06/2023    CALCIUM 8.9 02/06/2023    PROTEINTOT 7.7 02/06/2023    ALBUMIN 4.1 02/06/2023    BILITOT <0.2 02/06/2023    ALKPHOS 55 02/06/2023    AST 15 02/06/2023    ALT 13 02/06/2023             ASSESSMENT & PLAN:  1.  History of severe anemia due to heavy menses with elevated factor VIII activity and hence referred to hematology.  Please see my note from 2/6/2023 for an exhaustive explanation of my " skepticism towards there being any significant clinical relevance of the elevation of her factor VIII activity.She had a normal collagen epinephrine but a collagen ADP slightly below normal at 52.  Typically if this were a drug-induced platelet inhibition the epinephrine would be abnormal and the ADP would be normal.  Typically if this were an abnormal platelet function due to storage pool defects etc., she would have an abnormal ADP and an abnormal epinephrine.  The relevance of a marginally low collagen ADP with a normal epinephrine is not clear and I will repeat that to see if it is reproducible and if so then I will send her on to  pediatric hematology that deals with hemophilia and von Willebrand's to work this up further though I do not think she likely has either of these but could have a more rare storage pool defect.  Her hemoglobin was improving on iron now at 10.9 last month with MCV 83.4 and normal white count platelet count and differential.  CMP was unremarkable.  I will see her back in a few weeks to go over her repeat platelet function assay.  She has no clinical stigmata beyond heavy menses to suggest any bleeding disorders and typically platelet storage pool defects while potentially causing heavy menses usually causes other bleeding problems of which she has had none.  She did feel that her pulse started to race and felt a bit puny during her menses and we will see where her hemoglobin is but if it is lower I suspect it is the combination of menses along with not supplementing iron adequately since last I saw her.    Total time of care today inclusive of time spent today prior to her arrival reviewing interval labs and during visit translating this information to her and after visit putting forth a plan as outlined above took 46 minutes of patient care time throughout the day today.  Tin Cortes MD    03/03/2023

## 2023-04-05 ENCOUNTER — OFFICE VISIT (OUTPATIENT)
Dept: OBSTETRICS AND GYNECOLOGY | Facility: CLINIC | Age: 23
End: 2023-04-05
Payer: COMMERCIAL

## 2023-04-05 VITALS
WEIGHT: 256.4 LBS | SYSTOLIC BLOOD PRESSURE: 110 MMHG | HEIGHT: 67 IN | DIASTOLIC BLOOD PRESSURE: 80 MMHG | BODY MASS INDEX: 40.24 KG/M2

## 2023-04-05 DIAGNOSIS — N92.1 MENORRHAGIA WITH IRREGULAR CYCLE: ICD-10-CM

## 2023-04-05 DIAGNOSIS — Z09 FOLLOW-UP EXAM: Primary | ICD-10-CM

## 2023-04-05 DIAGNOSIS — D64.9 ANEMIA, UNSPECIFIED TYPE: ICD-10-CM

## 2023-04-05 PROCEDURE — 99213 OFFICE O/P EST LOW 20 MIN: CPT | Performed by: NURSE PRACTITIONER

## 2023-04-05 NOTE — PROGRESS NOTES
"Chief Complaint  Eliza Aden is a 22 y.o.  female presenting for Follow-up (3 month follow-up after starting Balcoltra.)    History of Present Illness  Eliza is a very pleasant young 21yo nulligravid woman.  She is here for follow up of menorrhagia, to the extent that she required transfusions (2 units PRBC).  She is doing very well now on Balcoltra.  Her menses come only when planned by the COCPs.  No intermenstrual bleeding.  The menses are much lighter, shorter, and less painful.  The flow will last 4 days long, and be a normal amt.  (On the heaviest day, she can wear a pad/ tampon for ~ 4 hrs.)  She has not yet become SA.  She has no abd or pelvic pain.  No ACHES.  Otherwise, ROS negative.  Her extensive hematology work-up was essentially negative.          The following portions of the patient's history were reviewed and updated as appropriate: allergies, current medications, past family history, past medical history, past social history, past surgical history and problem list.    No Known Allergies      Current Outpatient Medications:   •  ferrous sulfate 325 (65 FE) MG tablet, 325 mg p.o. twice daily every other day with vitamin C, Disp: 30 tablet, Rfl: 11  •  Levonorgest-Eth Estrad-Fe Bisg (Balcoltra) 0.1-20 MG-MCG(21) per tablet, Take 1 tablet by mouth Daily., Disp: 28 tablet, Rfl: 12    Past Medical History:   Diagnosis Date   • Anxiety    • Asthma         Past Surgical History:   Procedure Laterality Date   • WISDOM TOOTH EXTRACTION         Objective  /80   Ht 170.2 cm (67\")   Wt 116 kg (256 lb 6.4 oz)   LMP 2023 (Exact Date)   Breastfeeding No   BMI 40.16 kg/m²     Physical Exam  Vitals and nursing note reviewed.   Constitutional:       Appearance: Normal appearance.   Cardiovascular:      Rate and Rhythm: Normal rate and regular rhythm.      Heart sounds: No murmur heard.  Pulmonary:      Effort: Pulmonary effort is normal. No respiratory distress.      Breath sounds: " Normal breath sounds.   Skin:     General: Skin is warm and dry.   Neurological:      Mental Status: She is alert and oriented to person, place, and time.   Psychiatric:         Mood and Affect: Mood normal.         Behavior: Behavior normal.         Assessment/Plan   Diagnoses and all orders for this visit:    1. Follow-up exam (Primary)    2. Menorrhagia with irregular cycle    3. Anemia, unspecified type    Encouraged to continue the iron supplements, and try to eat plenty of iron-rich foods.  Do continue the Balcoltra.      Procedures    19 to 39: Counseling/Anticipatory Guidance Discussed: nutrition and family planning/contraception    Return for Annual physical in [place] Russell, APRN  04/05/2023

## 2024-01-31 ENCOUNTER — OFFICE VISIT (OUTPATIENT)
Dept: OBSTETRICS AND GYNECOLOGY | Facility: CLINIC | Age: 24
End: 2024-01-31
Payer: COMMERCIAL

## 2024-01-31 VITALS
BODY MASS INDEX: 40.46 KG/M2 | SYSTOLIC BLOOD PRESSURE: 140 MMHG | DIASTOLIC BLOOD PRESSURE: 92 MMHG | WEIGHT: 257.8 LBS | HEIGHT: 67 IN

## 2024-01-31 DIAGNOSIS — Z86.2 HISTORY OF ANEMIA: ICD-10-CM

## 2024-01-31 DIAGNOSIS — L70.9 ACNE, UNSPECIFIED ACNE TYPE: ICD-10-CM

## 2024-01-31 DIAGNOSIS — E66.01 MORBID OBESITY WITH BMI OF 40.0-44.9, ADULT: ICD-10-CM

## 2024-01-31 DIAGNOSIS — Z01.411 ENCOUNTER FOR GYNECOLOGICAL EXAMINATION WITH ABNORMAL FINDING: Primary | ICD-10-CM

## 2024-01-31 NOTE — PROGRESS NOTES
"Chief Complaint  Eliza Aden is a 23 y.o.  female presenting for Annual Exam and Contraception (Patient discontinued birth control pills.  Please discuss contraception.  )    History of Present Illness  Eliza is a pleasant 22yo, nulligravid woman, here for annual gyn exam.  She has no history of any gynecologic surgeries.  She does have a hx of menorrhagia and severe anemia, necessitating a transfusion (2 units PRBC) last year.    She tolerated Balcoltra well, and at last follow up, she was having normal, monthly, 4 day menses.  States in 2023, she stopped having withdrawal menses with each pack.    She discontinued the pills on her own.  States she is having spontaneous menses, now regular on her own (6d/ mo).  She  in July, and states they are using condoms faithfully.  She declines hormonal methods at this time.  Her  has joined the [employer].  After he completes training, they will be moving away.  She is troubled re: wt gain.  She works out at the gym 2 or more times/ week and diets, but cannot lose weight.  TSH has been checked in past yr (wnl).    The following portions of the patient's history were reviewed and updated as appropriate: allergies, current medications, past family history, past medical history, past social history, past surgical history, and problem list.    No Known Allergies    No current outpatient medications on file.    Past Medical History:   Diagnosis Date    Anxiety     Asthma         Past Surgical History:   Procedure Laterality Date    WISDOM TOOTH EXTRACTION         Objective  /92   Ht 170.2 cm (67\")   Wt 117 kg (257 lb 12.8 oz)   LMP 2024 (Exact Date)   Breastfeeding No   BMI 40.38 kg/m²     Physical Exam  Vitals and nursing note reviewed. Exam conducted with a chaperone present.   Constitutional:       General: She is not in acute distress.     Appearance: Normal appearance. She is not ill-appearing.   HENT:      Head: " Normocephalic.   Neck:      Thyroid: No thyroid mass or thyromegaly.   Cardiovascular:      Rate and Rhythm: Normal rate and regular rhythm.      Heart sounds: Normal heart sounds. No murmur heard.  Pulmonary:      Effort: Pulmonary effort is normal. No respiratory distress.      Breath sounds: Normal breath sounds.   Chest:   Breasts:     Right: No inverted nipple, mass or nipple discharge.      Left: No inverted nipple, mass or nipple discharge.   Abdominal:      Palpations: Abdomen is soft. There is no mass.      Tenderness: There is no abdominal tenderness.   Genitourinary:     General: Normal vulva.      Labia:         Right: No rash, tenderness or lesion.         Left: No rash, tenderness or lesion.       Vagina: Normal. No erythema.      Cervix: No discharge, lesion or erythema.      Uterus: Not enlarged and not tender.       Adnexa:         Right: No mass or tenderness.          Left: No mass or tenderness.        Comments: Anus appears wnl.  No rectal exam performed.  Lymphadenopathy:      Upper Body:      Right upper body: No supraclavicular or axillary adenopathy.      Left upper body: No supraclavicular or axillary adenopathy.   Skin:     General: Skin is warm and dry.      Findings: Acne present.      Comments: Mild acne (face)   Neurological:      Mental Status: She is alert and oriented to person, place, and time.   Psychiatric:         Mood and Affect: Mood normal.         Behavior: Behavior normal.         Assessment/Plan   Diagnoses and all orders for this visit:    1. Encounter for gynecological examination with abnormal finding (Primary)  -     LIQUID-BASED PAP SMEAR WITH HPV GENOTYPING IF ASCUS ([facility])    2. Morbid obesity with BMI of 40.0-44.9, adult  -     Ambulatory Referral to Weight Management Program  -     Comprehensive Metabolic Panel; Future  -     Hemoglobin A1c; Future  -     Insulin, Random; Future    3. Acne, unspecified acne type  -     Testosterone; Future  -      DHEA-Sulfate; Future    4. History of anemia  -     CBC & Differential; Future        Procedures        Return in about 1 year (around 1/31/2025) for Annual physical.    Laisha Russell, APRN  01/31/2024

## 2024-02-07 LAB — REF LAB TEST METHOD: NORMAL

## 2024-08-22 ENCOUNTER — TELEPHONE (OUTPATIENT)
Dept: OBSTETRICS AND GYNECOLOGY | Facility: CLINIC | Age: 24
End: 2024-08-22
Payer: COMMERCIAL

## 2024-12-23 DIAGNOSIS — Z34.90 EARLY STAGE OF PREGNANCY: Primary | ICD-10-CM

## 2024-12-26 ENCOUNTER — TELEPHONE (OUTPATIENT)
Dept: OBSTETRICS AND GYNECOLOGY | Facility: CLINIC | Age: 24
End: 2024-12-26
Payer: COMMERCIAL

## 2024-12-26 ENCOUNTER — OFFICE VISIT (OUTPATIENT)
Dept: OBSTETRICS AND GYNECOLOGY | Facility: CLINIC | Age: 24
End: 2024-12-26
Payer: COMMERCIAL

## 2024-12-26 VITALS — BODY MASS INDEX: 40.38 KG/M2 | SYSTOLIC BLOOD PRESSURE: 126 MMHG | HEIGHT: 67 IN | DIASTOLIC BLOOD PRESSURE: 84 MMHG

## 2024-12-26 DIAGNOSIS — O20.0 THREATENED ABORTION: Primary | ICD-10-CM

## 2024-12-26 DIAGNOSIS — O20.9 BLEEDING IN EARLY PREGNANCY: Primary | ICD-10-CM

## 2024-12-26 RX ORDER — PRENATAL VIT NO.126/IRON/FOLIC 28MG-0.8MG
TABLET ORAL DAILY
COMMUNITY

## 2024-12-26 NOTE — PROGRESS NOTES
"    Chief Complaint   Patient presents with    Threatened Miscarriage          HPI  Eliza Aden is a 24 y.o. female, , Patient's last menstrual period was 2024 (exact date). who presents with bleeding with positive pregnancy test.    She reports generalized cramping starting on  with spotting starting on . This morning she woke up with a saturated pad with bright red blood and 2 clots. She reports the cramping has stopped and denies unilateral cramping.     HCGs  2024-20  2024-69  2024-157  2024-158.31    Recent Tests:  She  had positive pregnancy test on .    US today: Yes.    She has not had prenatal care.  She complains of cramping pain.  The pain is located in her left-lower quadrant.. Her past medical history is non-contributory.  She does not know if there has been passage of tissue.  Rh Status: Positive  She reports no additional symptoms or complaints.    The additional following portions of the patient's history were reviewed and updated as appropriate: allergies, current medications, past family history, past medical history, past social history, past surgical history, and problem list.    Review of Systems   Constitutional: Negative.    HENT: Negative.     Eyes: Negative.    Respiratory: Negative.     Cardiovascular: Negative.    Gastrointestinal: Negative.    Endocrine: Negative.    Genitourinary:  Positive for pelvic pain and vaginal bleeding.   Musculoskeletal: Negative.    Skin: Negative.    Allergic/Immunologic: Negative.    Neurological: Negative.    Hematological: Negative.    Psychiatric/Behavioral: Negative.       All other systems reviewed and are negative.     I have reviewed and agree with the HPI, ROS, and historical information as entered above. Dia Atkins, APRN      Objective   /84   Ht 170.2 cm (67\")   LMP 2024 (Exact Date)   BMI 40.38 kg/m²     Physical Exam  Vitals and nursing note reviewed.   Constitutional:  "      General: She is not in acute distress.     Appearance: Normal appearance. She is not ill-appearing.   Pulmonary:      Effort: Pulmonary effort is normal. No respiratory distress.   Skin:     General: Skin is warm and dry.   Neurological:      Mental Status: She is alert and oriented to person, place, and time.   Psychiatric:         Mood and Affect: Mood normal.         Behavior: Behavior normal.            Assessment and Plan    Problem List Items Addressed This Visit    None  Visit Diagnoses       Threatened     -  Primary          D/w pt likely AB.  US today with no GS, adnexal mass.  MBT +.    Written order for hcg for early am on .  Ectopic precautions.  Will call with results and plan.        Dia Atkins, APRN  2024

## 2024-12-26 NOTE — TELEPHONE ENCOUNTER
Sw pt.   LMP on 11/2--- 7w 5d  Has had two HCG labs that are not increasing appropriately. The patient is having PCP fax those Labs to us.     She reports generalized cramping starting on 12/23 with spotting starting on 12/24. This morning she woke up with a saturated pad with bright red blood and 2 clots. She reports the cramping has stopped and denies unilateral cramping. Patient was advised of ectopic precautions and was added on for US and appt today per [facility].     MBT:  A +     Awaiting HCG labs to be faxed to us.

## 2024-12-27 ENCOUNTER — DOCUMENTATION (OUTPATIENT)
Dept: OBSTETRICS AND GYNECOLOGY | Facility: CLINIC | Age: 24
End: 2024-12-27
Payer: COMMERCIAL

## 2024-12-27 NOTE — PROGRESS NOTES
I called [facility] this morning to get results.  Per lab employee, level is 47 and he is faxing the report.     I called pt.  Notified hcg is declining well--- 158 on 12/26 and 47 on 12/27.  Likely SAB.  Reminded of ectopic precautions.  Order for hcg faxed to [facility] at [phone] for Monday 12/30.  Call prn problems to go to nearest ER.   JOSE +

## 2025-01-02 ENCOUNTER — TELEPHONE (OUTPATIENT)
Dept: OBSTETRICS AND GYNECOLOGY | Facility: CLINIC | Age: 25
End: 2025-01-02
Payer: COMMERCIAL

## 2025-01-02 NOTE — TELEPHONE ENCOUNTER
I called [facility] lab on 12/31/24.  They had no lab results from the order or be drawn 12/30.      I attempted to call pt.  Got her voicemail and mailbox is full.      Second attempt to contact pt--- voicemail and mailbox is full.

## 2025-01-17 ENCOUNTER — DOCUMENTATION (OUTPATIENT)
Dept: OBSTETRICS AND GYNECOLOGY | Facility: CLINIC | Age: 25
End: 2025-01-17
Payer: COMMERCIAL

## 2025-01-17 NOTE — PROGRESS NOTES
Attempted to call pt again.  No answer on cell phone and voicemail is full.    Sent a letter to pt through [email or web address].